# Patient Record
Sex: FEMALE | Race: BLACK OR AFRICAN AMERICAN | NOT HISPANIC OR LATINO | URBAN - METROPOLITAN AREA
[De-identification: names, ages, dates, MRNs, and addresses within clinical notes are randomized per-mention and may not be internally consistent; named-entity substitution may affect disease eponyms.]

---

## 2017-01-05 ENCOUNTER — OUTPATIENT (OUTPATIENT)
Dept: OUTPATIENT SERVICES | Facility: HOSPITAL | Age: 72
LOS: 1 days | Discharge: HOME | End: 2017-01-05

## 2017-01-05 ENCOUNTER — APPOINTMENT (OUTPATIENT)
Dept: INFUSION THERAPY | Facility: HOSPITAL | Age: 72
End: 2017-01-05

## 2017-01-05 DIAGNOSIS — C50.919 MALIGNANT NEOPLASM OF UNSPECIFIED SITE OF UNSPECIFIED FEMALE BREAST: ICD-10-CM

## 2017-01-05 DIAGNOSIS — Z98.89 OTHER SPECIFIED POSTPROCEDURAL STATES: Chronic | ICD-10-CM

## 2017-02-15 ENCOUNTER — OUTPATIENT (OUTPATIENT)
Dept: OUTPATIENT SERVICES | Facility: HOSPITAL | Age: 72
LOS: 1 days | Discharge: HOME | End: 2017-02-15

## 2017-02-15 DIAGNOSIS — Z98.89 OTHER SPECIFIED POSTPROCEDURAL STATES: Chronic | ICD-10-CM

## 2017-02-15 DIAGNOSIS — C50.919 MALIGNANT NEOPLASM OF UNSPECIFIED SITE OF UNSPECIFIED FEMALE BREAST: ICD-10-CM

## 2017-02-16 ENCOUNTER — APPOINTMENT (OUTPATIENT)
Dept: INFUSION THERAPY | Facility: HOSPITAL | Age: 72
End: 2017-02-16

## 2017-02-20 ENCOUNTER — APPOINTMENT (OUTPATIENT)
Dept: SURGICAL ONCOLOGY | Facility: CLINIC | Age: 72
End: 2017-02-20

## 2017-02-20 VITALS
HEIGHT: 61 IN | DIASTOLIC BLOOD PRESSURE: 90 MMHG | OXYGEN SATURATION: 96 % | TEMPERATURE: 98.2 F | RESPIRATION RATE: 15 BRPM | SYSTOLIC BLOOD PRESSURE: 140 MMHG | HEART RATE: 62 BPM

## 2017-03-30 ENCOUNTER — RESULT REVIEW (OUTPATIENT)
Age: 72
End: 2017-03-30

## 2017-03-30 ENCOUNTER — OUTPATIENT (OUTPATIENT)
Dept: OUTPATIENT SERVICES | Facility: HOSPITAL | Age: 72
LOS: 1 days | Discharge: HOME | End: 2017-03-30

## 2017-03-30 DIAGNOSIS — Z98.89 OTHER SPECIFIED POSTPROCEDURAL STATES: Chronic | ICD-10-CM

## 2017-03-30 DIAGNOSIS — C50.919 MALIGNANT NEOPLASM OF UNSPECIFIED SITE OF UNSPECIFIED FEMALE BREAST: ICD-10-CM

## 2017-03-31 ENCOUNTER — LABORATORY RESULT (OUTPATIENT)
Age: 72
End: 2017-03-31

## 2017-03-31 ENCOUNTER — APPOINTMENT (OUTPATIENT)
Dept: INFUSION THERAPY | Facility: HOSPITAL | Age: 72
End: 2017-03-31

## 2017-03-31 ENCOUNTER — APPOINTMENT (OUTPATIENT)
Dept: HEMATOLOGY ONCOLOGY | Facility: CLINIC | Age: 72
End: 2017-03-31

## 2017-03-31 VITALS
WEIGHT: 166.45 LBS | OXYGEN SATURATION: 94 % | BODY MASS INDEX: 31.45 KG/M2 | TEMPERATURE: 98.3 F | HEART RATE: 57 BPM | RESPIRATION RATE: 15 BRPM | SYSTOLIC BLOOD PRESSURE: 186 MMHG | DIASTOLIC BLOOD PRESSURE: 93 MMHG

## 2017-03-31 DIAGNOSIS — R76.8 OTHER SPECIFIED ABNORMAL IMMUNOLOGICAL FINDINGS IN SERUM: ICD-10-CM

## 2017-03-31 LAB
HCT VFR BLD CALC: 41.3 % — SIGNIFICANT CHANGE UP (ref 34.5–45)
HGB BLD-MCNC: 14.7 G/DL — SIGNIFICANT CHANGE UP (ref 11.5–15.5)
MCHC RBC-ENTMCNC: 33.3 PG — SIGNIFICANT CHANGE UP (ref 27–34)
MCHC RBC-ENTMCNC: 35.7 G/DL — SIGNIFICANT CHANGE UP (ref 32–36)
MCV RBC AUTO: 93.1 FL — SIGNIFICANT CHANGE UP (ref 80–100)
PLATELET # BLD AUTO: 250 K/UL — SIGNIFICANT CHANGE UP (ref 150–400)
RBC # BLD: 4.43 M/UL — SIGNIFICANT CHANGE UP (ref 3.8–5.2)
RBC # FLD: 11.5 % — SIGNIFICANT CHANGE UP (ref 10.3–14.5)
WBC # BLD: 6 K/UL — SIGNIFICANT CHANGE UP (ref 3.8–10.5)
WBC # FLD AUTO: 6 K/UL — SIGNIFICANT CHANGE UP (ref 3.8–10.5)

## 2017-04-05 ENCOUNTER — RESULT REVIEW (OUTPATIENT)
Age: 72
End: 2017-04-05

## 2017-05-01 ENCOUNTER — CHART COPY (OUTPATIENT)
Age: 72
End: 2017-05-01

## 2017-05-05 ENCOUNTER — OUTPATIENT (OUTPATIENT)
Dept: OUTPATIENT SERVICES | Facility: HOSPITAL | Age: 72
LOS: 1 days | End: 2017-05-05
Payer: COMMERCIAL

## 2017-05-05 VITALS
WEIGHT: 167.99 LBS | SYSTOLIC BLOOD PRESSURE: 147 MMHG | HEIGHT: 61 IN | TEMPERATURE: 97 F | OXYGEN SATURATION: 98 % | DIASTOLIC BLOOD PRESSURE: 89 MMHG | RESPIRATION RATE: 16 BRPM | HEART RATE: 55 BPM

## 2017-05-05 DIAGNOSIS — Z98.89 OTHER SPECIFIED POSTPROCEDURAL STATES: Chronic | ICD-10-CM

## 2017-05-05 DIAGNOSIS — Z01.818 ENCOUNTER FOR OTHER PREPROCEDURAL EXAMINATION: ICD-10-CM

## 2017-05-05 DIAGNOSIS — C50.912 MALIGNANT NEOPLASM OF UNSPECIFIED SITE OF LEFT FEMALE BREAST: ICD-10-CM

## 2017-05-05 DIAGNOSIS — I10 ESSENTIAL (PRIMARY) HYPERTENSION: ICD-10-CM

## 2017-05-05 DIAGNOSIS — C50.919 MALIGNANT NEOPLASM OF UNSPECIFIED SITE OF UNSPECIFIED FEMALE BREAST: ICD-10-CM

## 2017-05-05 DIAGNOSIS — Z00.00 ENCOUNTER FOR GENERAL ADULT MEDICAL EXAMINATION WITHOUT ABNORMAL FINDINGS: ICD-10-CM

## 2017-05-05 PROCEDURE — G0463: CPT

## 2017-05-05 RX ORDER — LIDOCAINE HCL 20 MG/ML
0.2 VIAL (ML) INJECTION ONCE
Qty: 0 | Refills: 0 | Status: DISCONTINUED | OUTPATIENT
Start: 2017-05-10 | End: 2017-05-25

## 2017-05-05 RX ORDER — CELECOXIB 200 MG/1
200 CAPSULE ORAL ONCE
Qty: 0 | Refills: 0 | Status: COMPLETED | OUTPATIENT
Start: 2017-05-10 | End: 2017-05-10

## 2017-05-05 RX ORDER — SODIUM CHLORIDE 9 MG/ML
3 INJECTION INTRAMUSCULAR; INTRAVENOUS; SUBCUTANEOUS EVERY 8 HOURS
Qty: 0 | Refills: 0 | Status: DISCONTINUED | OUTPATIENT
Start: 2017-05-10 | End: 2017-05-25

## 2017-05-05 RX ORDER — ACETAMINOPHEN 500 MG
975 TABLET ORAL ONCE
Qty: 0 | Refills: 0 | Status: COMPLETED | OUTPATIENT
Start: 2017-05-10 | End: 2017-05-10

## 2017-05-05 NOTE — H&P PST ADULT - NSANTHOSAYNRD_GEN_A_CORE
No. SWATHI screening performed.  STOP BANG Legend: 0-2 = LOW Risk; 3-4 = INTERMEDIATE Risk; 5-8 = HIGH Risk

## 2017-05-05 NOTE — H&P PST ADULT - PMH
Arthritis  right knee  Breast cancer  left s/p chemo and radiation  History of hepatitis B virus infection    Hypertension

## 2017-05-05 NOTE — H&P PST ADULT - HISTORY OF PRESENT ILLNESS
71 year old female PMH of HTN, Hepatitis B ( followed by GI), breast cancer s/p chemo and radiation 2015 for removal of mediport

## 2017-05-10 ENCOUNTER — OUTPATIENT (OUTPATIENT)
Dept: OUTPATIENT SERVICES | Facility: HOSPITAL | Age: 72
LOS: 1 days | End: 2017-05-10
Payer: COMMERCIAL

## 2017-05-10 ENCOUNTER — APPOINTMENT (OUTPATIENT)
Dept: SURGICAL ONCOLOGY | Facility: HOSPITAL | Age: 72
End: 2017-05-10

## 2017-05-10 ENCOUNTER — TRANSCRIPTION ENCOUNTER (OUTPATIENT)
Age: 72
End: 2017-05-10

## 2017-05-10 VITALS
HEIGHT: 61 IN | DIASTOLIC BLOOD PRESSURE: 97 MMHG | SYSTOLIC BLOOD PRESSURE: 180 MMHG | OXYGEN SATURATION: 98 % | TEMPERATURE: 98 F | WEIGHT: 167.99 LBS | RESPIRATION RATE: 16 BRPM | HEART RATE: 58 BPM

## 2017-05-10 VITALS
TEMPERATURE: 98 F | SYSTOLIC BLOOD PRESSURE: 145 MMHG | OXYGEN SATURATION: 100 % | DIASTOLIC BLOOD PRESSURE: 80 MMHG | HEART RATE: 57 BPM | RESPIRATION RATE: 18 BRPM

## 2017-05-10 DIAGNOSIS — C50.912 MALIGNANT NEOPLASM OF UNSPECIFIED SITE OF LEFT FEMALE BREAST: ICD-10-CM

## 2017-05-10 DIAGNOSIS — Z00.00 ENCOUNTER FOR GENERAL ADULT MEDICAL EXAMINATION WITHOUT ABNORMAL FINDINGS: ICD-10-CM

## 2017-05-10 DIAGNOSIS — Z98.89 OTHER SPECIFIED POSTPROCEDURAL STATES: Chronic | ICD-10-CM

## 2017-05-10 PROCEDURE — 36590 REMOVAL TUNNELED CV CATH: CPT

## 2017-05-10 RX ORDER — ONDANSETRON 8 MG/1
4 TABLET, FILM COATED ORAL ONCE
Qty: 0 | Refills: 0 | Status: DISCONTINUED | OUTPATIENT
Start: 2017-05-10 | End: 2017-05-25

## 2017-05-10 RX ORDER — SODIUM CHLORIDE 9 MG/ML
1000 INJECTION INTRAMUSCULAR; INTRAVENOUS; SUBCUTANEOUS
Qty: 0 | Refills: 0 | Status: DISCONTINUED | OUTPATIENT
Start: 2017-05-10 | End: 2017-05-25

## 2017-05-10 RX ORDER — OXYCODONE HYDROCHLORIDE 5 MG/1
5 TABLET ORAL ONCE
Qty: 0 | Refills: 0 | Status: DISCONTINUED | OUTPATIENT
Start: 2017-05-10 | End: 2017-05-10

## 2017-05-10 RX ADMIN — CELECOXIB 200 MILLIGRAM(S): 200 CAPSULE ORAL at 10:39

## 2017-05-10 RX ADMIN — Medication 975 MILLIGRAM(S): at 10:38

## 2017-05-10 NOTE — ASU DISCHARGE PLAN (ADULT/PEDIATRIC). - MEDICATION SUMMARY - MEDICATIONS TO TAKE
I will START or STAY ON the medications listed below when I get home from the hospital:    metoprolol succinate 25 mg oral tablet, extended release  -- 1 tab(s) by mouth once a day evening  -- Indication: For blood pressure    famotidine 20 mg oral tablet  --  by mouth as per ASU protocol   -- Indication: For GERD    Vitamin D2 50,000 intl units (1.25 mg) oral capsule  --  by mouth once a day  -- Indication: For Supplement

## 2017-05-10 NOTE — ASU DISCHARGE PLAN (ADULT/PEDIATRIC). - NOTIFY
Pain not relieved by Medications/Fever greater than 101 Bleeding that does not stop/Persistent Nausea and Vomiting/Inability to Tolerate Liquids or Foods/Pain not relieved by Medications/Swelling that continues/Unable to Urinate/Fever greater than 101

## 2017-05-11 ENCOUNTER — OUTPATIENT (OUTPATIENT)
Dept: OUTPATIENT SERVICES | Facility: HOSPITAL | Age: 72
LOS: 1 days | Discharge: HOME | End: 2017-05-11

## 2017-05-11 DIAGNOSIS — C50.919 MALIGNANT NEOPLASM OF UNSPECIFIED SITE OF UNSPECIFIED FEMALE BREAST: ICD-10-CM

## 2017-05-11 DIAGNOSIS — Z98.89 OTHER SPECIFIED POSTPROCEDURAL STATES: Chronic | ICD-10-CM

## 2017-05-12 ENCOUNTER — APPOINTMENT (OUTPATIENT)
Dept: INFUSION THERAPY | Facility: HOSPITAL | Age: 72
End: 2017-05-12

## 2017-06-26 ENCOUNTER — APPOINTMENT (OUTPATIENT)
Dept: SURGICAL ONCOLOGY | Facility: CLINIC | Age: 72
End: 2017-06-26

## 2017-06-26 VITALS
BODY MASS INDEX: 32.85 KG/M2 | SYSTOLIC BLOOD PRESSURE: 154 MMHG | DIASTOLIC BLOOD PRESSURE: 81 MMHG | HEIGHT: 61 IN | OXYGEN SATURATION: 94 % | WEIGHT: 174 LBS | HEART RATE: 77 BPM

## 2017-07-28 ENCOUNTER — OUTPATIENT (OUTPATIENT)
Dept: OUTPATIENT SERVICES | Facility: HOSPITAL | Age: 72
LOS: 1 days | Discharge: HOME | End: 2017-07-28

## 2017-07-28 DIAGNOSIS — Z98.89 OTHER SPECIFIED POSTPROCEDURAL STATES: Chronic | ICD-10-CM

## 2017-07-28 DIAGNOSIS — C50.919 MALIGNANT NEOPLASM OF UNSPECIFIED SITE OF UNSPECIFIED FEMALE BREAST: ICD-10-CM

## 2017-08-02 ENCOUNTER — RESULT REVIEW (OUTPATIENT)
Age: 72
End: 2017-08-02

## 2017-08-02 ENCOUNTER — APPOINTMENT (OUTPATIENT)
Dept: HEMATOLOGY ONCOLOGY | Facility: CLINIC | Age: 72
End: 2017-08-02
Payer: MEDICARE

## 2017-08-02 VITALS
BODY MASS INDEX: 32.41 KG/M2 | TEMPERATURE: 98.3 F | HEART RATE: 62 BPM | RESPIRATION RATE: 16 BRPM | SYSTOLIC BLOOD PRESSURE: 136 MMHG | WEIGHT: 171.52 LBS | OXYGEN SATURATION: 93 % | DIASTOLIC BLOOD PRESSURE: 85 MMHG

## 2017-08-02 LAB
HCT VFR BLD CALC: 42 % — SIGNIFICANT CHANGE UP (ref 34.5–45)
HGB BLD-MCNC: 15.4 G/DL — SIGNIFICANT CHANGE UP (ref 11.5–15.5)
MCHC RBC-ENTMCNC: 34.9 PG — HIGH (ref 27–34)
MCHC RBC-ENTMCNC: 36.7 G/DL — HIGH (ref 32–36)
MCV RBC AUTO: 95.1 FL — SIGNIFICANT CHANGE UP (ref 80–100)
PLATELET # BLD AUTO: 266 K/UL — SIGNIFICANT CHANGE UP (ref 150–400)
RBC # BLD: 4.42 M/UL — SIGNIFICANT CHANGE UP (ref 3.8–5.2)
RBC # FLD: 11.8 % — SIGNIFICANT CHANGE UP (ref 10.3–14.5)
WBC # BLD: 7.3 K/UL — SIGNIFICANT CHANGE UP (ref 3.8–10.5)
WBC # FLD AUTO: 7.3 K/UL — SIGNIFICANT CHANGE UP (ref 3.8–10.5)

## 2017-08-02 PROCEDURE — 99214 OFFICE O/P EST MOD 30 MIN: CPT

## 2017-08-08 LAB
ALBUMIN SERPL ELPH-MCNC: 4.6 G/DL
ALP BLD-CCNC: 116 U/L
ALT SERPL-CCNC: 43 U/L
ANION GAP SERPL CALC-SCNC: 15 MMOL/L
AST SERPL-CCNC: 31 U/L
BILIRUB SERPL-MCNC: 0.5 MG/DL
BUN SERPL-MCNC: 15 MG/DL
CALCIUM SERPL-MCNC: 10.1 MG/DL
CEA SERPL-MCNC: 1.3 NG/ML
CHLORIDE SERPL-SCNC: 106 MMOL/L
CO2 SERPL-SCNC: 24 MMOL/L
CREAT SERPL-MCNC: 0.59 MG/DL
GLUCOSE SERPL-MCNC: 74 MG/DL
HBV DNA # SERPL NAA+PROBE: NOT DETECTED IU/ML
HEPB DNA PCR LOG: NOT DETECTED LOGIU/ML
POTASSIUM SERPL-SCNC: 4.4 MMOL/L
PROT SERPL-MCNC: 7.8 G/DL
SODIUM SERPL-SCNC: 145 MMOL/L

## 2017-10-26 ENCOUNTER — APPOINTMENT (OUTPATIENT)
Dept: MAMMOGRAPHY | Facility: IMAGING CENTER | Age: 72
End: 2017-10-26
Payer: MEDICARE

## 2017-10-26 ENCOUNTER — OUTPATIENT (OUTPATIENT)
Dept: OUTPATIENT SERVICES | Facility: HOSPITAL | Age: 72
LOS: 1 days | End: 2017-10-26
Payer: COMMERCIAL

## 2017-10-26 ENCOUNTER — APPOINTMENT (OUTPATIENT)
Dept: ULTRASOUND IMAGING | Facility: IMAGING CENTER | Age: 72
End: 2017-10-26
Payer: MEDICARE

## 2017-10-26 DIAGNOSIS — C50.919 MALIGNANT NEOPLASM OF UNSPECIFIED SITE OF UNSPECIFIED FEMALE BREAST: ICD-10-CM

## 2017-10-26 DIAGNOSIS — Z98.89 OTHER SPECIFIED POSTPROCEDURAL STATES: Chronic | ICD-10-CM

## 2017-10-26 PROCEDURE — G0279: CPT

## 2017-10-26 PROCEDURE — G0204: CPT | Mod: 26

## 2017-10-26 PROCEDURE — G0279: CPT | Mod: 26

## 2017-10-26 PROCEDURE — 77066 DX MAMMO INCL CAD BI: CPT

## 2017-11-02 ENCOUNTER — APPOINTMENT (OUTPATIENT)
Dept: SURGICAL ONCOLOGY | Facility: CLINIC | Age: 72
End: 2017-11-02
Payer: MEDICARE

## 2017-11-02 VITALS
DIASTOLIC BLOOD PRESSURE: 95 MMHG | OXYGEN SATURATION: 93 % | HEART RATE: 84 BPM | BODY MASS INDEX: 32.85 KG/M2 | WEIGHT: 174 LBS | HEIGHT: 61 IN | SYSTOLIC BLOOD PRESSURE: 152 MMHG

## 2017-11-02 PROCEDURE — 99214 OFFICE O/P EST MOD 30 MIN: CPT

## 2017-11-29 ENCOUNTER — RESULT REVIEW (OUTPATIENT)
Age: 72
End: 2017-11-29

## 2017-12-06 ENCOUNTER — OUTPATIENT (OUTPATIENT)
Dept: OUTPATIENT SERVICES | Facility: HOSPITAL | Age: 72
LOS: 1 days | Discharge: HOME | End: 2017-12-06

## 2017-12-06 DIAGNOSIS — Z98.89 OTHER SPECIFIED POSTPROCEDURAL STATES: Chronic | ICD-10-CM

## 2017-12-06 DIAGNOSIS — C50.919 MALIGNANT NEOPLASM OF UNSPECIFIED SITE OF UNSPECIFIED FEMALE BREAST: ICD-10-CM

## 2017-12-12 ENCOUNTER — APPOINTMENT (OUTPATIENT)
Dept: HEMATOLOGY ONCOLOGY | Facility: CLINIC | Age: 72
End: 2017-12-12
Payer: MEDICARE

## 2017-12-12 ENCOUNTER — RESULT REVIEW (OUTPATIENT)
Age: 72
End: 2017-12-12

## 2017-12-12 VITALS
BODY MASS INDEX: 32.91 KG/M2 | OXYGEN SATURATION: 94 % | TEMPERATURE: 98.1 F | SYSTOLIC BLOOD PRESSURE: 170 MMHG | WEIGHT: 174.17 LBS | RESPIRATION RATE: 16 BRPM | DIASTOLIC BLOOD PRESSURE: 109 MMHG | HEART RATE: 79 BPM

## 2017-12-12 LAB
HCT VFR BLD CALC: 44.1 % — SIGNIFICANT CHANGE UP (ref 34.5–45)
HGB BLD-MCNC: 15.8 G/DL — HIGH (ref 11.5–15.5)
MCHC RBC-ENTMCNC: 34.3 PG — HIGH (ref 27–34)
MCHC RBC-ENTMCNC: 35.9 G/DL — SIGNIFICANT CHANGE UP (ref 32–36)
MCV RBC AUTO: 95.5 FL — SIGNIFICANT CHANGE UP (ref 80–100)
PLATELET # BLD AUTO: 265 K/UL — SIGNIFICANT CHANGE UP (ref 150–400)
RBC # BLD: 4.61 M/UL — SIGNIFICANT CHANGE UP (ref 3.8–5.2)
RBC # FLD: 11.8 % — SIGNIFICANT CHANGE UP (ref 10.3–14.5)
WBC # BLD: 6.8 K/UL — SIGNIFICANT CHANGE UP (ref 3.8–10.5)
WBC # FLD AUTO: 6.8 K/UL — SIGNIFICANT CHANGE UP (ref 3.8–10.5)

## 2017-12-12 PROCEDURE — 99215 OFFICE O/P EST HI 40 MIN: CPT

## 2017-12-27 ENCOUNTER — APPOINTMENT (OUTPATIENT)
Dept: HEMATOLOGY ONCOLOGY | Facility: CLINIC | Age: 72
End: 2017-12-27
Payer: SELF-PAY

## 2017-12-27 PROCEDURE — 96040M: CUSTOM

## 2018-02-08 ENCOUNTER — OUTPATIENT (OUTPATIENT)
Dept: OUTPATIENT SERVICES | Facility: HOSPITAL | Age: 73
LOS: 1 days | Discharge: HOME | End: 2018-02-08

## 2018-02-08 DIAGNOSIS — C50.919 MALIGNANT NEOPLASM OF UNSPECIFIED SITE OF UNSPECIFIED FEMALE BREAST: ICD-10-CM

## 2018-02-08 DIAGNOSIS — Z98.89 OTHER SPECIFIED POSTPROCEDURAL STATES: Chronic | ICD-10-CM

## 2018-02-13 ENCOUNTER — APPOINTMENT (OUTPATIENT)
Dept: HEMATOLOGY ONCOLOGY | Facility: CLINIC | Age: 73
End: 2018-02-13
Payer: SELF-PAY

## 2018-02-13 PROCEDURE — 99499A: CUSTOM | Mod: NC

## 2018-02-15 ENCOUNTER — APPOINTMENT (OUTPATIENT)
Dept: ULTRASOUND IMAGING | Facility: IMAGING CENTER | Age: 73
End: 2018-02-15

## 2018-02-16 ENCOUNTER — INPATIENT (INPATIENT)
Facility: HOSPITAL | Age: 73
LOS: 6 days | Discharge: NURSING, REHAB OR LONG-TERM CARE | End: 2018-02-23
Attending: HOSPITALIST | Admitting: HOSPITALIST
Payer: MEDICARE

## 2018-02-16 VITALS
OXYGEN SATURATION: 100 % | TEMPERATURE: 98 F | DIASTOLIC BLOOD PRESSURE: 87 MMHG | RESPIRATION RATE: 16 BRPM | SYSTOLIC BLOOD PRESSURE: 133 MMHG | HEART RATE: 85 BPM

## 2018-02-16 DIAGNOSIS — I63.9 CEREBRAL INFARCTION, UNSPECIFIED: ICD-10-CM

## 2018-02-16 DIAGNOSIS — I10 ESSENTIAL (PRIMARY) HYPERTENSION: ICD-10-CM

## 2018-02-16 DIAGNOSIS — N39.0 URINARY TRACT INFECTION, SITE NOT SPECIFIED: ICD-10-CM

## 2018-02-16 DIAGNOSIS — Z98.89 OTHER SPECIFIED POSTPROCEDURAL STATES: Chronic | ICD-10-CM

## 2018-02-16 DIAGNOSIS — M19.90 UNSPECIFIED OSTEOARTHRITIS, UNSPECIFIED SITE: ICD-10-CM

## 2018-02-16 LAB
ALBUMIN SERPL ELPH-MCNC: 4.3 G/DL — SIGNIFICANT CHANGE UP (ref 3.3–5)
ALP SERPL-CCNC: 97 U/L — SIGNIFICANT CHANGE UP (ref 40–120)
ALT FLD-CCNC: 31 U/L — SIGNIFICANT CHANGE UP (ref 4–33)
APPEARANCE UR: CLEAR — SIGNIFICANT CHANGE UP
APTT BLD: 31.4 SEC — SIGNIFICANT CHANGE UP (ref 27.5–37.4)
AST SERPL-CCNC: 24 U/L — SIGNIFICANT CHANGE UP (ref 4–32)
BACTERIA # UR AUTO: SIGNIFICANT CHANGE UP
BASE EXCESS BLDV CALC-SCNC: 3.4 MMOL/L — SIGNIFICANT CHANGE UP
BASOPHILS # BLD AUTO: 0.04 K/UL — SIGNIFICANT CHANGE UP (ref 0–0.2)
BASOPHILS NFR BLD AUTO: 0.6 % — SIGNIFICANT CHANGE UP (ref 0–2)
BILIRUB SERPL-MCNC: 0.6 MG/DL — SIGNIFICANT CHANGE UP (ref 0.2–1.2)
BILIRUB UR-MCNC: NEGATIVE — SIGNIFICANT CHANGE UP
BLOOD GAS VENOUS - CREATININE: 0.39 MG/DL — LOW (ref 0.5–1.3)
BLOOD UR QL VISUAL: HIGH
BUN SERPL-MCNC: 8 MG/DL — SIGNIFICANT CHANGE UP (ref 7–23)
CALCIUM SERPL-MCNC: 9.2 MG/DL — SIGNIFICANT CHANGE UP (ref 8.4–10.5)
CHLORIDE BLDV-SCNC: 107 MMOL/L — SIGNIFICANT CHANGE UP (ref 96–108)
CHLORIDE SERPL-SCNC: 100 MMOL/L — SIGNIFICANT CHANGE UP (ref 98–107)
CO2 SERPL-SCNC: 25 MMOL/L — SIGNIFICANT CHANGE UP (ref 22–31)
COLOR SPEC: YELLOW — SIGNIFICANT CHANGE UP
CREAT SERPL-MCNC: 0.53 MG/DL — SIGNIFICANT CHANGE UP (ref 0.5–1.3)
EOSINOPHIL # BLD AUTO: 0.08 K/UL — SIGNIFICANT CHANGE UP (ref 0–0.5)
EOSINOPHIL NFR BLD AUTO: 1.2 % — SIGNIFICANT CHANGE UP (ref 0–6)
GAS PNL BLDV: 136 MMOL/L — SIGNIFICANT CHANGE UP (ref 136–146)
GLUCOSE BLDV-MCNC: 123 — HIGH (ref 70–99)
GLUCOSE SERPL-MCNC: 120 MG/DL — HIGH (ref 70–99)
GLUCOSE UR-MCNC: NEGATIVE — SIGNIFICANT CHANGE UP
HCO3 BLDV-SCNC: 26 MMOL/L — SIGNIFICANT CHANGE UP (ref 20–27)
HCT VFR BLD CALC: 45.5 % — HIGH (ref 34.5–45)
HCT VFR BLDV CALC: 48.5 % — HIGH (ref 34.5–45)
HGB BLD-MCNC: 15.7 G/DL — HIGH (ref 11.5–15.5)
HGB BLDV-MCNC: 15.9 G/DL — HIGH (ref 11.5–15.5)
IMM GRANULOCYTES # BLD AUTO: 0.03 # — SIGNIFICANT CHANGE UP
IMM GRANULOCYTES NFR BLD AUTO: 0.5 % — SIGNIFICANT CHANGE UP (ref 0–1.5)
INR BLD: 1.06 — SIGNIFICANT CHANGE UP (ref 0.88–1.17)
KETONES UR-MCNC: NEGATIVE — SIGNIFICANT CHANGE UP
LACTATE BLDV-MCNC: 2.4 MMOL/L — HIGH (ref 0.5–2)
LEUKOCYTE ESTERASE UR-ACNC: HIGH
LYMPHOCYTES # BLD AUTO: 1.83 K/UL — SIGNIFICANT CHANGE UP (ref 1–3.3)
LYMPHOCYTES # BLD AUTO: 28.2 % — SIGNIFICANT CHANGE UP (ref 13–44)
MCHC RBC-ENTMCNC: 32 PG — SIGNIFICANT CHANGE UP (ref 27–34)
MCHC RBC-ENTMCNC: 34.5 % — SIGNIFICANT CHANGE UP (ref 32–36)
MCV RBC AUTO: 92.9 FL — SIGNIFICANT CHANGE UP (ref 80–100)
MONOCYTES # BLD AUTO: 0.52 K/UL — SIGNIFICANT CHANGE UP (ref 0–0.9)
MONOCYTES NFR BLD AUTO: 8 % — SIGNIFICANT CHANGE UP (ref 2–14)
MUCOUS THREADS # UR AUTO: SIGNIFICANT CHANGE UP
NEUTROPHILS # BLD AUTO: 4 K/UL — SIGNIFICANT CHANGE UP (ref 1.8–7.4)
NEUTROPHILS NFR BLD AUTO: 61.5 % — SIGNIFICANT CHANGE UP (ref 43–77)
NITRITE UR-MCNC: NEGATIVE — SIGNIFICANT CHANGE UP
NRBC # FLD: 0 — SIGNIFICANT CHANGE UP
PCO2 BLDV: 48 MMHG — SIGNIFICANT CHANGE UP (ref 41–51)
PH BLDV: 7.38 PH — SIGNIFICANT CHANGE UP (ref 7.32–7.43)
PH UR: 6.5 — SIGNIFICANT CHANGE UP (ref 4.6–8)
PLATELET # BLD AUTO: 257 K/UL — SIGNIFICANT CHANGE UP (ref 150–400)
PMV BLD: 8.8 FL — SIGNIFICANT CHANGE UP (ref 7–13)
PO2 BLDV: 34 MMHG — LOW (ref 35–40)
POTASSIUM BLDV-SCNC: 3.6 MMOL/L — SIGNIFICANT CHANGE UP (ref 3.4–4.5)
POTASSIUM SERPL-MCNC: 4 MMOL/L — SIGNIFICANT CHANGE UP (ref 3.5–5.3)
POTASSIUM SERPL-SCNC: 4 MMOL/L — SIGNIFICANT CHANGE UP (ref 3.5–5.3)
PROT SERPL-MCNC: 7.9 G/DL — SIGNIFICANT CHANGE UP (ref 6–8.3)
PROT UR-MCNC: 20 MG/DL — SIGNIFICANT CHANGE UP
PROTHROM AB SERPL-ACNC: 12.2 SEC — SIGNIFICANT CHANGE UP (ref 9.8–13.1)
RBC # BLD: 4.9 M/UL — SIGNIFICANT CHANGE UP (ref 3.8–5.2)
RBC # FLD: 12.3 % — SIGNIFICANT CHANGE UP (ref 10.3–14.5)
RBC CASTS # UR COMP ASSIST: HIGH (ref 0–?)
SAO2 % BLDV: 61.3 % — SIGNIFICANT CHANGE UP (ref 60–85)
SODIUM SERPL-SCNC: 140 MMOL/L — SIGNIFICANT CHANGE UP (ref 135–145)
SP GR SPEC: 1.02 — SIGNIFICANT CHANGE UP (ref 1–1.04)
SQUAMOUS # UR AUTO: SIGNIFICANT CHANGE UP
UROBILINOGEN FLD QL: NORMAL MG/DL — SIGNIFICANT CHANGE UP
WBC # BLD: 6.5 K/UL — SIGNIFICANT CHANGE UP (ref 3.8–10.5)
WBC # FLD AUTO: 6.5 K/UL — SIGNIFICANT CHANGE UP (ref 3.8–10.5)
WBC UR QL: HIGH (ref 0–?)

## 2018-02-16 PROCEDURE — 99223 1ST HOSP IP/OBS HIGH 75: CPT

## 2018-02-16 PROCEDURE — 70547 MR ANGIOGRAPHY NECK W/O DYE: CPT | Mod: 26

## 2018-02-16 PROCEDURE — 70551 MRI BRAIN STEM W/O DYE: CPT | Mod: 26

## 2018-02-16 PROCEDURE — 70450 CT HEAD/BRAIN W/O DYE: CPT | Mod: 26

## 2018-02-16 RX ORDER — ASPIRIN/CALCIUM CARB/MAGNESIUM 324 MG
81 TABLET ORAL DAILY
Qty: 0 | Refills: 0 | Status: DISCONTINUED | OUTPATIENT
Start: 2018-02-17 | End: 2018-02-23

## 2018-02-16 RX ORDER — ONDANSETRON 8 MG/1
4 TABLET, FILM COATED ORAL EVERY 8 HOURS
Qty: 0 | Refills: 0 | Status: DISCONTINUED | OUTPATIENT
Start: 2018-02-16 | End: 2018-02-23

## 2018-02-16 RX ORDER — ATORVASTATIN CALCIUM 80 MG/1
80 TABLET, FILM COATED ORAL AT BEDTIME
Qty: 0 | Refills: 0 | Status: DISCONTINUED | OUTPATIENT
Start: 2018-02-16 | End: 2018-02-23

## 2018-02-16 RX ORDER — ENOXAPARIN SODIUM 100 MG/ML
40 INJECTION SUBCUTANEOUS EVERY 24 HOURS
Qty: 0 | Refills: 0 | Status: DISCONTINUED | OUTPATIENT
Start: 2018-02-16 | End: 2018-02-23

## 2018-02-16 RX ORDER — SODIUM CHLORIDE 9 MG/ML
1000 INJECTION INTRAMUSCULAR; INTRAVENOUS; SUBCUTANEOUS ONCE
Qty: 0 | Refills: 0 | Status: COMPLETED | OUTPATIENT
Start: 2018-02-16 | End: 2018-02-16

## 2018-02-16 RX ORDER — MAGNESIUM SULFATE 500 MG/ML
1 VIAL (ML) INJECTION ONCE
Qty: 0 | Refills: 0 | Status: COMPLETED | OUTPATIENT
Start: 2018-02-16 | End: 2018-02-16

## 2018-02-16 RX ORDER — ONDANSETRON 8 MG/1
4 TABLET, FILM COATED ORAL ONCE
Qty: 0 | Refills: 0 | Status: COMPLETED | OUTPATIENT
Start: 2018-02-16 | End: 2018-02-16

## 2018-02-16 RX ORDER — ACETAMINOPHEN 500 MG
650 TABLET ORAL EVERY 6 HOURS
Qty: 0 | Refills: 0 | Status: DISCONTINUED | OUTPATIENT
Start: 2018-02-16 | End: 2018-02-23

## 2018-02-16 RX ORDER — ASPIRIN/CALCIUM CARB/MAGNESIUM 324 MG
81 TABLET ORAL ONCE
Qty: 0 | Refills: 0 | Status: COMPLETED | OUTPATIENT
Start: 2018-02-16 | End: 2018-02-16

## 2018-02-16 RX ADMIN — ENOXAPARIN SODIUM 40 MILLIGRAM(S): 100 INJECTION SUBCUTANEOUS at 21:25

## 2018-02-16 RX ADMIN — ONDANSETRON 4 MILLIGRAM(S): 8 TABLET, FILM COATED ORAL at 10:15

## 2018-02-16 RX ADMIN — Medication 100 GRAM(S): at 18:13

## 2018-02-16 RX ADMIN — SODIUM CHLORIDE 1000 MILLILITER(S): 9 INJECTION INTRAMUSCULAR; INTRAVENOUS; SUBCUTANEOUS at 10:15

## 2018-02-16 RX ADMIN — ATORVASTATIN CALCIUM 80 MILLIGRAM(S): 80 TABLET, FILM COATED ORAL at 22:46

## 2018-02-16 RX ADMIN — Medication 81 MILLIGRAM(S): at 13:42

## 2018-02-16 RX ADMIN — ONDANSETRON 4 MILLIGRAM(S): 8 TABLET, FILM COATED ORAL at 21:48

## 2018-02-16 NOTE — CONSULT NOTE ADULT - ATTENDING COMMENTS
Pt was presented on AM rounds and examined at bedside. States that she was evaluated by another neurologist 2 weeks age similar symptoms and MRI at that time showed an acute stroke, unknown location. Today's exam is non focal.  MRI head shows both left and right hemispheric acute strokes suggesting an embolic event.  No lesion identified to explain her vertigo. Will need TTE with bubble study and JORGE.

## 2018-02-16 NOTE — H&P ADULT - FAMILY HISTORY
Father  Still living? Unknown  Family history of stroke, Age at diagnosis: Age Unknown     Sibling  Still living? Unknown  Family history of hypertension, Age at diagnosis: Age Unknown

## 2018-02-16 NOTE — ED PROVIDER NOTE - PROGRESS NOTE DETAILS
Pt stable. VSS. Labs, imaging, neuro c/s appreciated. Pt's PMD is [facility], will admit to hospitalist. Dr. Mullen and Tele PA aware.  Won Reynolds MD PGY-4

## 2018-02-16 NOTE — H&P ADULT - ASSESSMENT
71 yo F with a PMHx of CVA, HTN, breast cancer (s/p surgery), HTN, History of Hepatitis B virus infection, Arthritis R knee, presents to the ED c/o headache and dizziness that began this morning, with left lower lobe infarct (indeterminate age), admitted to telemetry to r/o stroke.

## 2018-02-16 NOTE — H&P ADULT - NEGATIVE CARDIOVASCULAR SYMPTOMS
no peripheral edema/no dyspnea on exertion/no orthopnea/no claudication/no paroxysmal nocturnal dyspnea/no palpitations

## 2018-02-16 NOTE — H&P ADULT - NSHPSOCIALHISTORY_GEN_ALL_CORE
73 yo F lives with her  and son. She uses a cane to walk around the house. Pt denies alcohol, illicit drug use, and/or smoking.

## 2018-02-16 NOTE — ED PROVIDER NOTE - PMH
Arthritis  right knee  Breast cancer  left s/p chemo and radiation  CVA (cerebral vascular accident)    History of hepatitis B virus infection    Hypertension

## 2018-02-16 NOTE — ED ADULT NURSE NOTE - OBJECTIVE STATEMENT
pt brought in by family for dizziness and vomiting--pt seen in ER 2 weeks ago and had a slight stroke  pt had #20 placed rt antecubital--labs drawn and sent

## 2018-02-16 NOTE — CONSULT NOTE ADULT - SUBJECTIVE AND OBJECTIVE BOX
NEUROLOGY CONSULT NOTE    Patient is a 72y old  Female who presents with a chief complaint of     HPI:  Ms. Luis is a 72 RHD y/o woman pmh recently diagnosed HTN and recently diagnosed CVA unknown location she p/w 'room spinning' sensation that began upon awakening this am. Dizziness a/w HA and nausea.  CRUZ described a throbbing posterior HA. She was recently diagnosed with a stroke on MRI but unable to state cva's location. Was scheduled for cerebral vessel imaging today but missed it to visit ER for symptoms.      In the ER she received po valium but vomited it. Her CT head w/o johanny showed Left frontal lobe infarct of indeterminate age.    Neurological Review of Systems:  No difficulty with language.  No vision loss or double vision.  No dizziness, vertigo or new hearing loss.  No difficulty with speech or swallowing.  No focal weakness.  No focal sensory changes.  No numbness or tingling in the bilateral lower extremities.  No difficulty with balance.  No difficulty with ambulation.        MEDICATIONS  (STANDING):  aspirin  chewable 81 milliGRAM(s) Oral once  diazepam    Tablet 5 milliGRAM(s) Oral once    MEDICATIONS  (PRN):    Allergies    No Known Allergies    Intolerances      PAST MEDICAL & SURGICAL HISTORY:  CVA (cerebral vascular accident)  Arthritis: right knee  Breast cancer: left s/p chemo and radiation  History of hepatitis B virus infection  Hypertension  S/P lumpectomy, left breast: 2015 with axilaary node dissection    FAMILY HISTORY:    SOCIAL HISTORY: non smoker/ former smoker/ active smoker    Review of Systems:  Constitutional: No generalized weakness. No fevers or chills.                    Eyes, Ears, Mouth, Throat: No vision loss   Respiratory: No shortness of breath or cough.                                Cardiovascular: No chest pain or palpitations  Gastrointestinal: No nausea or vomiting.                                         Genitourinary: No urinary incontinence or burning on urination.  Musculoskeletal: No joint pain.                                                           Dermatologic: No rash.  Neurological: as per HPI                                                                      Psychiatric: No behavioral problems.  Endocrine: No known hypoglycemia.               Hematologic/Lymphatic: No easy bleeding.    O:  Vital Signs Last 24 Hrs  T(C): 36.6 (2018 09:06), Max: 36.6 (2018 09:06)  T(F): 97.9 (2018 09:06), Max: 97.9 (2018 09:06)  HR: 86 (2018 13:16) (85 - 86)  BP: 120/78 (2018 13:16) (120/78 - 133/87)  BP(mean): --  RR: 16 (2018 13:16) (16 - 16)  SpO2: 100% (2018 13:16) (100% - 100%)    General Exam:   General appearance: No acute distress                 Cardiovascular: Pedal dorsalis pulses intact bilaterally    MENTAL STATUS: Orientated to self, date and place.  Attention intact.  No dysarthria, aphasia or neglect.  Knowledge intact.  Registration intact.       CRANIAL NERVES: CN I - not tested.  PERRL, EOMI, VFF, no nystagmus or diplopia.  No APD.  Fundi not visualized.  CN V1-3 intact to light touch and pinprick.  No facial asymmetry.  Hearing intact to finger rub bilaterally.  Tongue, uvula and palate midline.  Sternocleidomastoid and Trapezius intact bilaterally.    MOTOR:   Tone: normal w/ 5/5 x 4, no drift.                   No dysmetria on finger-nose-finger or heel-shin-heel    SENSORY: intact to light touch     GAIT: unable to walk independently.  Yesterday was able to walk without any deficits.       LABS:                        15.7   6.50  )-----------( 257      ( 2018 09:55 )             45.5     02-16    140  |  100  |  8   ----------------------------<  120<H>  4.0   |  25  |  0.53    Ca    9.2      2018 09:55    TPro  7.9  /  Alb  4.3  /  TBili  0.6  /  DBili  x   /  AST  24  /  ALT  31  /  AlkPhos  97  02-16    PT/INR - ( 2018 09:55 )   PT: 12.2 SEC;   INR: 1.06          PTT - ( 2018 09:55 )  PTT:31.4 SEC  Urinalysis Basic - ( 2018 11:00 )    Color: YELLOW / Appearance: CLEAR / S.017 / pH: 6.5  Gluc: NEGATIVE / Ketone: NEGATIVE  / Bili: NEGATIVE / Urobili: NORMAL mg/dL   Blood: TRACE / Protein: 20 mg/dL / Nitrite: NEGATIVE   Leuk Esterase: SMALL / RBC: 5-10 / WBC 5-10   Sq Epi: OCC / Non Sq Epi: x / Bacteria: FEW      LIVER FUNCTIONS - ( 2018 09:55 )  Alb: 4.3 g/dL / Pro: 7.9 g/dL / ALK PHOS: 97 u/L / ALT: 31 u/L / AST: 24 u/L / GGT: x                   RADIOLOGY & ADDITIONAL STUDIES:    [x] CTH: Left frontal lobe infarct of indeterminate age. If clinically warranted recommend brain MRI if not contraindicated or short-term follow-up head CT. No acute intracranial hemorrhage. No midline shift.

## 2018-02-16 NOTE — ED PROVIDER NOTE - ATTENDING CONTRIBUTION TO CARE
I was physically present for the E/M service provided. I agree with above history, physical, and plan which I have reviewed and edited where appropriate. I was physically present for the key portions of the service provided.    72 yr old female with hx of CVA, HTN, left breast cancer (s/p lumpectomy and Lymph node dissection) presents to ed c/o dizziness since am.  pt had MRI outpt 1 wk ago showing + " small cva".  pt 2 wks ago had unsteady gait, n/v, dizziness which resolved.  no fever, no chills, no visual changes, no headache, no numbness or tingling, no sob, no cough, no cp, no palpitations, no leg swelling, no syncope, no abd pain, no n/v/d, no dysuria, no rashes    *GEN:   comfortable, in no apparent distress, AOx3  *EYES:   PERRL, extra-occular movements intact  *HEENT:   airway patent, moist mucosal membranes, uvula midline  *CV:   regular rate and rhythm, normal S1/S2, no murmur  *RESP:   clear to auscultation bilaterally, non-labored, speaking in full sentences  *ABD:   soft, non tender, no guarding  *MSK:   no musculoskeletal tenderness, 5/5 strength, moving all extremity  *SKIN:   dry, intact, no rash  *NEURO:   AOx3, no focal weakness or loss of sensation, GCS 15, normal finger to nose, normal rapid hand movement    pt with confirmed outpt cva will obtain ct head, labs, ekg, neuro consult give asa since pt didn't take it- admit for further cva workup.

## 2018-02-16 NOTE — ED ADULT TRIAGE NOTE - CHIEF COMPLAINT QUOTE
Pt brought in by EMS for episode of dizziness and nausea this morning that has currently mostly resolved. Pt had minor stroke 3 weeks ago w slight right sided residual weakness.  Denies any slurred speech or new unilateral weakness. No new neuro deficits noted.

## 2018-02-16 NOTE — H&P ADULT - ATTENDING COMMENTS
71 yo F with h/o breast cancer s/p lumectomy/xrt/chemo x12 tx (completed 2 years ago) and HTN w/ recent dx of CVA presenting w/ vertigo and HA, now improved aside from mild HA.  AVSS, labs wnl, seen by neuro who recommend further w/u.    #Vertigo  -MRA/MRI  -TTE  -tele  -meclizine/valium/zofran prn    #HTN: permissive HTN until tomorrow however now normotensive  -monitor BP    # Breast Ca in remission  -OP Tiffanie f/u

## 2018-02-16 NOTE — ED ADULT NURSE REASSESSMENT NOTE - NS ED NURSE REASSESS COMMENT FT1
Pt. alert, awake, sitting up in stretcher.  Reports partial improvement of symptoms/dizziness since arrival. Respirations even, unlabored. No obvious neuro deficits or acute weakness. Sent to MRI in [facility] accompanied by family.

## 2018-02-16 NOTE — H&P ADULT - NSHPLABSRESULTS_GEN_ALL_CORE
LABS:                        15.7   6.50  )-----------( 257      ( 2018 09:55 )             45.5         140  |  100  |  8   ----------------------------<  120<H>  4.0   |  25  |  0.53    Ca    9.2      2018 09:55    TPro  7.9  /  Alb  4.3  /  TBili  0.6  /  DBili  x   /  AST  24  /  ALT  31  /  AlkPhos  97        PT/INR - ( 2018 09:55 )   PT: 12.2 SEC;   INR: 1.06          PTT - ( 2018 09:55 )  PTT:31.4 SEC      Urinalysis Basic - ( 2018 11:00 )    Color: YELLOW / Appearance: CLEAR / S.017 / pH: 6.5  Gluc: NEGATIVE / Ketone: NEGATIVE  / Bili: NEGATIVE / Urobili: NORMAL mg/dL   Blood: TRACE / Protein: 20 mg/dL / Nitrite: NEGATIVE   Leuk Esterase: SMALL / RBC: 5-10 / WBC 5-10   Sq Epi: OCC / Non Sq Epi: x / Bacteria: FEW    VBG  @ 09:55  pH: 7.38/pCO2: 48 /pO2: 34/HCO3: 26/lactate: 2.4    RADIOLOGY & ADDITIONAL TESTS:    CT reviewed   Left frontal lobe infarct   Mild-moderate chronic microvascular cerebral white matter changes.

## 2018-02-16 NOTE — H&P ADULT - NEUROLOGICAL DETAILS
alert and oriented x 3/responds to verbal commands/sensation intact responds to verbal commands/alert and oriented x 3/responds to pain/cranial nerves intact/normal strength/sensation intact

## 2018-02-16 NOTE — CONSULT NOTE ADULT - ASSESSMENT
71 y/o RHD woman pmh htn, cva p/w 'room spinning' sensation upon awakening this am that is monophasic. No new motor/sensory/language/vision deficits. NIHSS 0. CT H shows left fontal lobe infarct. NIHSS 0 but symptomatic dizziness persists    [] Would admit   [] Cohort to 4th floor  [] MRI brain w/o johanny  [] MRA head w/o johanny  [] MRA neck w/o johanny  [] TTE with bubble study  [] Permissive HTN x 24 hr with gradual normotensive goals beginning 2/17/18  [] asa 81mg  [] A1c and LDL level  [] statin (goal LDL <100)  [] PT/OT 71 y/o RHD woman pmh htn, cva p/w 'room spinning' sensation upon awakening this am that is monophasic. No new motor/sensory/language/vision deficits. NIHSS 0. CT H shows left fontal lobe infarct. NIHSS 0 but symptomatic dizziness persists    [] Would admit   [] Cohort to 4th floor  [] MRI brain w/o johanny  [] MRA head w/o johanny  [] MRA neck w/o johanny  [] TTE with bubble study  [] Permissive HTN x 24 hr with gradual normotensive goals beginning 2/17/18  [] asa 81mg  [] A1c and LDL level  [] statin (goal LDL <100)  [] PT/OT  [] Urine culture

## 2018-02-16 NOTE — ED PROVIDER NOTE - MEDICAL DECISION MAKING DETAILS
vertigo concern for central given hx, will obtain ct, labs, ecg, consult neuro, admit for further w/u and management

## 2018-02-16 NOTE — H&P ADULT - NSHPPHYSICALEXAM_GEN_ALL_CORE
T(C): 36.6 (02-16-18 @ 09:06), Max: 36.6 (02-16-18 @ 09:06)  HR: 86 (02-16-18 @ 13:16) (85 - 86)  BP: 120/78 (02-16-18 @ 13:16) (120/78 - 133/87)  RR: 16 (02-16-18 @ 13:16) (16 - 16)  SpO2: 100% (02-16-18 @ 13:16) (100% - 100%)

## 2018-02-16 NOTE — H&P ADULT - HISTORY OF PRESENT ILLNESS
71 yo F with a PMHx of CVA, HTN, breast cancer (s/p surgery), HTN, History of Hepatitis B virus infection, Arthritis R knee, presents to the ED c/o headache and dizziness that began this morning. Pt reported that she was lying in bed this morning when she felt dizzy and that the "room was spinning." Pt mentioned that she had a headache along with the other symptoms and when her  took her BP, it was 160/103. Pt then tried to take her BP pills but vomited when tried to swallow it. Pt had a similar episode three weeks ago and when she had a MRI of the brain done, her neurologist told her that she had a mini Stroke. As a result, she was put on aspirin 81 mg. She was scheduled to get a Carotid Doppler today but was cancelled since she is now in the Hospital. At the hospital, pt was given valium but vomited up the pill. Pt mentioned that after she had her mini Stroke, pt mentioned that she has this L leg weakness. She also mentioned that her R leg is weak but that is due to her arthritis of her R knee. Lastly, she mentioned that "sometimes" her chest hurts but it has been going on for two years since she had radiation from her Breast Cancer. Pt had her flu shot two months ago. Pt denies: sick contacts, recent travel, fever, chills, N/V/D, dysuria, changes in weight, changes in appetite, changes in bowel movements, abdominal pain, changes in vision, blurry vision, diplopia, muscle/joint pain.

## 2018-02-16 NOTE — ED PROVIDER NOTE - OBJECTIVE STATEMENT
73 y/o F with PMH of CVA, HTN, breast cancer (s/p surgery) p/w dizziness since this morning. Pt describes room spinning sensation, associated with nausea and vomiting nbnb x 3. Pt had similar episodes two weeks ago, self-resolved, had outpt MRI showing a CVA. Pt denies chest pain, abd pain, diarrhea, fevers, chills, numbness, weakness, vision changes.  Neuro: Gelacio

## 2018-02-16 NOTE — H&P ADULT - MUSCULOSKELETAL
details… detailed exam ROM intact/no joint erythema/no joint warmth/no calf tenderness/normal strength

## 2018-02-17 DIAGNOSIS — I69.398 OTHER SEQUELAE OF CEREBRAL INFARCTION: ICD-10-CM

## 2018-02-17 DIAGNOSIS — Z29.9 ENCOUNTER FOR PROPHYLACTIC MEASURES, UNSPECIFIED: ICD-10-CM

## 2018-02-17 LAB
BUN SERPL-MCNC: 10 MG/DL — SIGNIFICANT CHANGE UP (ref 7–23)
CALCIUM SERPL-MCNC: 9.2 MG/DL — SIGNIFICANT CHANGE UP (ref 8.4–10.5)
CHLORIDE SERPL-SCNC: 101 MMOL/L — SIGNIFICANT CHANGE UP (ref 98–107)
CHOLEST SERPL-MCNC: 178 MG/DL — SIGNIFICANT CHANGE UP (ref 120–199)
CO2 SERPL-SCNC: 26 MMOL/L — SIGNIFICANT CHANGE UP (ref 22–31)
CREAT SERPL-MCNC: 0.48 MG/DL — LOW (ref 0.5–1.3)
GLUCOSE SERPL-MCNC: 107 MG/DL — HIGH (ref 70–99)
HBA1C BLD-MCNC: 6 % — HIGH (ref 4–5.6)
HCT VFR BLD CALC: 42 % — SIGNIFICANT CHANGE UP (ref 34.5–45)
HDLC SERPL-MCNC: 48 MG/DL — SIGNIFICANT CHANGE UP (ref 45–65)
HGB BLD-MCNC: 14.4 G/DL — SIGNIFICANT CHANGE UP (ref 11.5–15.5)
LIPID PNL WITH DIRECT LDL SERPL: 112 MG/DL — SIGNIFICANT CHANGE UP
MCHC RBC-ENTMCNC: 31.7 PG — SIGNIFICANT CHANGE UP (ref 27–34)
MCHC RBC-ENTMCNC: 34.3 % — SIGNIFICANT CHANGE UP (ref 32–36)
MCV RBC AUTO: 92.5 FL — SIGNIFICANT CHANGE UP (ref 80–100)
NRBC # FLD: 0 — SIGNIFICANT CHANGE UP
PLATELET # BLD AUTO: 268 K/UL — SIGNIFICANT CHANGE UP (ref 150–400)
PMV BLD: 8.9 FL — SIGNIFICANT CHANGE UP (ref 7–13)
POTASSIUM SERPL-MCNC: 4 MMOL/L — SIGNIFICANT CHANGE UP (ref 3.5–5.3)
POTASSIUM SERPL-SCNC: 4 MMOL/L — SIGNIFICANT CHANGE UP (ref 3.5–5.3)
RBC # BLD: 4.54 M/UL — SIGNIFICANT CHANGE UP (ref 3.8–5.2)
RBC # FLD: 12.2 % — SIGNIFICANT CHANGE UP (ref 10.3–14.5)
SODIUM SERPL-SCNC: 141 MMOL/L — SIGNIFICANT CHANGE UP (ref 135–145)
TRIGL SERPL-MCNC: 135 MG/DL — SIGNIFICANT CHANGE UP (ref 10–149)
TSH SERPL-MCNC: 1.11 UIU/ML — SIGNIFICANT CHANGE UP (ref 0.27–4.2)
WBC # BLD: 6.78 K/UL — SIGNIFICANT CHANGE UP (ref 3.8–10.5)
WBC # FLD AUTO: 6.78 K/UL — SIGNIFICANT CHANGE UP (ref 3.8–10.5)

## 2018-02-17 PROCEDURE — 99233 SBSQ HOSP IP/OBS HIGH 50: CPT

## 2018-02-17 RX ORDER — MECLIZINE HCL 12.5 MG
12.5 TABLET ORAL EVERY 8 HOURS
Qty: 0 | Refills: 0 | Status: DISCONTINUED | OUTPATIENT
Start: 2018-02-17 | End: 2018-02-23

## 2018-02-17 RX ADMIN — ENOXAPARIN SODIUM 40 MILLIGRAM(S): 100 INJECTION SUBCUTANEOUS at 21:29

## 2018-02-17 RX ADMIN — ATORVASTATIN CALCIUM 80 MILLIGRAM(S): 80 TABLET, FILM COATED ORAL at 21:29

## 2018-02-17 RX ADMIN — Medication 81 MILLIGRAM(S): at 12:02

## 2018-02-17 NOTE — PROGRESS NOTE ADULT - SUBJECTIVE AND OBJECTIVE BOX
Patient is a 72y old  Female who presents with a chief complaint of dizziness and headache x 1 day (2018 14:17)    SUBJECTIVE / OVERNIGHT EVENTS: Patient seen and examined. Currently, no complaints although she reports intermittent vertigo symptoms improved from admission. Denies any N/V or diarrhea.     Review of Systems: As per above     MEDICATIONS  (STANDING):  aspirin enteric coated 81 milliGRAM(s) Oral daily  atorvastatin 80 milliGRAM(s) Oral at bedtime  enoxaparin Injectable 40 milliGRAM(s) SubCutaneous every 24 hours    MEDICATIONS  (PRN):  acetaminophen   Tablet. 650 milliGRAM(s) Oral every 6 hours PRN mild, moderate pain  diazepam    Tablet 2 milliGRAM(s) Oral every 8 hours PRN Vertigo/Dizzines  meclizine 12.5 milliGRAM(s) Oral every 8 hours PRN Dizziness  ondansetron Injectable 4 milliGRAM(s) IV Push every 8 hours PRN Nausea and/or Vomiting    PHYSICAL EXAM:  Vital Signs Last 24 Hrs  T(C): 37.1 (2018 13:15), Max: 37.1 (2018 13:15)  T(F): 98.7 (2018 13:15), Max: 98.7 (2018 13:15)  HR: 83 (2018 13:15) (51 - 83)  BP: 135/74 (2018 13:15) (101/53 - 155/78)  BP(mean): --  RR: 18 (2018 13:15) (18 - 18)  SpO2: 99% (2018 13:15) (97% - 100%)    GENERAL: NAD, well-developed  HEAD:  Atraumatic, Normocephalic  EYES: EOMI, PERRLA, conjunctiva and sclera clear  NECK: Supple, No JVD  CHEST/LUNG: Clear to auscultation bilaterally; No wheeze  HEART: Regular rate and rhythm; No murmurs, rubs, or gallops  ABDOMEN: Soft, Nontender, Nondistended; Bowel sounds present  EXTREMITIES:  2+ Peripheral Pulses, No clubbing, cyanosis, or edema  PSYCH: AAOx3  NEUROLOGY: non-focal    LABS:  CAPILLARY BLOOD GLUCOSE                        14.4   6.78  )-----------( 268      ( 2018 07:00 )             42.0         141  |  101  |  10  ----------------------------<  107<H>  4.0   |  26  |  0.48<L>    Ca    9.2      2018 07:00    TPro  7.9  /  Alb  4.3  /  TBili  0.6  /  DBili  x   /  AST  24  /  ALT  31  /  AlkPhos  97  02-16    PT/INR - ( 2018 09:55 )   PT: 12.2 SEC;   INR: 1.06          PTT - ( 2018 09:55 )  PTT:31.4 SEC      Urinalysis Basic - ( 2018 11:00 )    Color: YELLOW / Appearance: CLEAR / S.017 / pH: 6.5  Gluc: NEGATIVE / Ketone: NEGATIVE  / Bili: NEGATIVE / Urobili: NORMAL mg/dL   Blood: TRACE / Protein: 20 mg/dL / Nitrite: NEGATIVE   Leuk Esterase: SMALL / RBC: 5-10 / WBC 5-10   Sq Epi: OCC / Non Sq Epi: x / Bacteria: FEW      RADIOLOGY & ADDITIONAL TESTS:    Imaging Personally Reviewed:    < from: MR Angio Neck No Cont (18 @ 17:25) >  IMPRESSION:      1. Minimal focus of diffusion hyperintensity in the right corona radiata   suggests small punctate focus of acute ischemia. Questionable second area   in the left frontal parasagittal region. Extensive white matter ischemic   changes involving the periventricular and subcortical white matter..    2. marked a of both the neck and [place] are technically limited   though at the intracranial level there does appear to be intracranial   vascular compromise with an stenosis in the right M1 greater than the   left M1 though present bilaterally as well as stenosis in the proximal   right A2 that all appear significant. Correlation with CTA is suggested   for confirmation as these vascular studies are technically limited    < end of copied text >    Consultant(s) Notes Reviewed:  Neuro    Care Discussed with Consultants/Other Providers:

## 2018-02-17 NOTE — PHYSICAL THERAPY INITIAL EVALUATION ADULT - PLANNED THERAPY INTERVENTIONS, PT EVAL
balance training/bed mobility training/stairs training/gait training/transfer training/strengthening

## 2018-02-17 NOTE — PHYSICAL THERAPY INITIAL EVALUATION ADULT - PERTINENT HX OF CURRENT PROBLEM, REHAB EVAL
pt is 57 y/o male admitted with chest pain and low blood sugar. pt is 71 y/o female admitted with dizziness and headache.

## 2018-02-17 NOTE — PHYSICAL THERAPY INITIAL EVALUATION ADULT - ADDITIONAL COMMENTS
pt report lives with sister in house, 5 steps to enter the basement, amb with a cane. pt report lives with  and son in house, 4 steps to enter, amb with a cane,.

## 2018-02-17 NOTE — PROGRESS NOTE ADULT - ASSESSMENT
71 yo F with a PMHx of CVA, HTN, breast cancer (s/p surgery), HTN, History of Hepatitis B virus infection, Arthritis R knee, presents to the ED c/o headache and dizziness that began this morning, with left lower lobe infarct (indeterminate age), admitted to telemetry found to have acute right CVA.

## 2018-02-18 LAB
BUN SERPL-MCNC: 8 MG/DL — SIGNIFICANT CHANGE UP (ref 7–23)
CALCIUM SERPL-MCNC: 8.5 MG/DL — SIGNIFICANT CHANGE UP (ref 8.4–10.5)
CHLORIDE SERPL-SCNC: 101 MMOL/L — SIGNIFICANT CHANGE UP (ref 98–107)
CO2 SERPL-SCNC: 26 MMOL/L — SIGNIFICANT CHANGE UP (ref 22–31)
CREAT SERPL-MCNC: 0.5 MG/DL — SIGNIFICANT CHANGE UP (ref 0.5–1.3)
GLUCOSE SERPL-MCNC: 105 MG/DL — HIGH (ref 70–99)
HCT VFR BLD CALC: 42.2 % — SIGNIFICANT CHANGE UP (ref 34.5–45)
HGB BLD-MCNC: 14.8 G/DL — SIGNIFICANT CHANGE UP (ref 11.5–15.5)
MAGNESIUM SERPL-MCNC: 2 MG/DL — SIGNIFICANT CHANGE UP (ref 1.6–2.6)
MCHC RBC-ENTMCNC: 32.4 PG — SIGNIFICANT CHANGE UP (ref 27–34)
MCHC RBC-ENTMCNC: 35.1 % — SIGNIFICANT CHANGE UP (ref 32–36)
MCV RBC AUTO: 92.3 FL — SIGNIFICANT CHANGE UP (ref 80–100)
NRBC # FLD: 0 — SIGNIFICANT CHANGE UP
PLATELET # BLD AUTO: 241 K/UL — SIGNIFICANT CHANGE UP (ref 150–400)
PMV BLD: 9 FL — SIGNIFICANT CHANGE UP (ref 7–13)
POTASSIUM SERPL-MCNC: 3.7 MMOL/L — SIGNIFICANT CHANGE UP (ref 3.5–5.3)
POTASSIUM SERPL-SCNC: 3.7 MMOL/L — SIGNIFICANT CHANGE UP (ref 3.5–5.3)
RBC # BLD: 4.57 M/UL — SIGNIFICANT CHANGE UP (ref 3.8–5.2)
RBC # FLD: 12.5 % — SIGNIFICANT CHANGE UP (ref 10.3–14.5)
SODIUM SERPL-SCNC: 139 MMOL/L — SIGNIFICANT CHANGE UP (ref 135–145)
WBC # BLD: 5.56 K/UL — SIGNIFICANT CHANGE UP (ref 3.8–10.5)
WBC # FLD AUTO: 5.56 K/UL — SIGNIFICANT CHANGE UP (ref 3.8–10.5)

## 2018-02-18 PROCEDURE — 99233 SBSQ HOSP IP/OBS HIGH 50: CPT

## 2018-02-18 RX ADMIN — ENOXAPARIN SODIUM 40 MILLIGRAM(S): 100 INJECTION SUBCUTANEOUS at 21:50

## 2018-02-18 RX ADMIN — ATORVASTATIN CALCIUM 80 MILLIGRAM(S): 80 TABLET, FILM COATED ORAL at 21:50

## 2018-02-18 RX ADMIN — Medication 81 MILLIGRAM(S): at 11:36

## 2018-02-18 NOTE — OCCUPATIONAL THERAPY INITIAL EVALUATION ADULT - LIVES WITH, PROFILE
Pt. reports she lives with her  and son in a house with 4 steps to enter. Once inside, pt. reports bedroom and bathroom are located on the main level. Per pt., she has a bathtub in her bathroom with shower chair available.

## 2018-02-18 NOTE — OCCUPATIONAL THERAPY INITIAL EVALUATION ADULT - PERTINENT HX OF CURRENT PROBLEM, REHAB EVAL
Pt is a 72 year old Female with a PMHx of CVA, HTN, breast cancer (s/p surgery), HTN, History of Hepatitis B virus infection, & Arthritis R knee, who presented to [facility] on 2/16/18 with c/o headache and dizziness. CT Head No Contrast on 2/16/18 displayed left frontal lobe infarct of indeterminate age. No acute intracranial hemorrhage. No midline shift. Mild-moderate chronic microvascular cerebral white matter changes.

## 2018-02-18 NOTE — PROGRESS NOTE ADULT - SUBJECTIVE AND OBJECTIVE BOX
Patient is a 72y old  Female who presents with a chief complaint of dizziness and headache x 1 day (16 Feb 2018 14:17)    SUBJECTIVE / OVERNIGHT EVENTS: Patient seen and examined. Currently, no vertigo symptoms. Denies any N/V or diarrhea.     Review of Systems: As per above     MEDICATIONS  (STANDING):  aspirin enteric coated 81 milliGRAM(s) Oral daily  atorvastatin 80 milliGRAM(s) Oral at bedtime  enoxaparin Injectable 40 milliGRAM(s) SubCutaneous every 24 hours    MEDICATIONS  (PRN):  acetaminophen   Tablet. 650 milliGRAM(s) Oral every 6 hours PRN mild, moderate pain  diazepam    Tablet 2 milliGRAM(s) Oral every 8 hours PRN Vertigo/Dizzines  meclizine 12.5 milliGRAM(s) Oral every 8 hours PRN Dizziness  ondansetron Injectable 4 milliGRAM(s) IV Push every 8 hours PRN Nausea and/or Vomiting    PHYSICAL EXAM:  Vital Signs Last 24 Hrs  T(C): 36.8 (18 Feb 2018 05:22), Max: 37.1 (17 Feb 2018 13:15)  T(F): 98.3 (18 Feb 2018 05:22), Max: 98.7 (17 Feb 2018 13:15)  HR: 71 (18 Feb 2018 05:22) (64 - 83)  BP: 128/82 (18 Feb 2018 05:22) (106/64 - 135/74)  BP(mean): --  RR: 18 (18 Feb 2018 05:22) (16 - 18)  SpO2: 99% (18 Feb 2018 05:22) (98% - 99%)    GENERAL: NAD, well-developed  HEAD:  Atraumatic, Normocephalic  EYES: EOMI, PERRLA, conjunctiva and sclera clear  NECK: Supple, No JVD  CHEST/LUNG: Clear to auscultation bilaterally; No wheeze  HEART: Regular rate and rhythm; No murmurs, rubs, or gallops  ABDOMEN: Soft, Nontender, Nondistended; Bowel sounds present  EXTREMITIES:  2+ Peripheral Pulses, No clubbing, cyanosis, or edema  PSYCH: AAOx3  NEUROLOGY: non-focal    LABS:                        14.8   5.56  )-----------( 241      ( 18 Feb 2018 07:00 )             42.2   02-18    139  |  101  |  8   ----------------------------<  105<H>  3.7   |  26  |  0.50    Ca    8.5      18 Feb 2018 07:00  Mg     2.0     02-18        RADIOLOGY & ADDITIONAL TESTS:    Imaging Personally Reviewed:    < from: MR Angio Neck No Cont (02.16.18 @ 17:25) >  IMPRESSION:      1. Minimal focus of diffusion hyperintensity in the right corona radiata   suggests small punctate focus of acute ischemia. Questionable second area   in the left frontal parasagittal region. Extensive white matter ischemic   changes involving the periventricular and subcortical white matter..    2. marked a of both the neck and [place] are technically limited   though at the intracranial level there does appear to be intracranial   vascular compromise with an stenosis in the right M1 greater than the   left M1 though present bilaterally as well as stenosis in the proximal   right A2 that all appear significant. Correlation with CTA is suggested   for confirmation as these vascular studies are technically limited    < end of copied text >    Consultant(s) Notes Reviewed:  Neuro    Care Discussed with Consultants/Other Providers:

## 2018-02-18 NOTE — OCCUPATIONAL THERAPY INITIAL EVALUATION ADULT - GENERAL OBSERVATIONS, REHAB EVAL
Pt. received semisupine in bed. No acute distress. Patient agreed to evaluation from Occupational Therapist. +Heplock, +Tele. Visitor bedside.

## 2018-02-18 NOTE — OCCUPATIONAL THERAPY INITIAL EVALUATION ADULT - MD ORDER
Occupational Therapy (OT) to evaluate and treat. Occupational Therapy (OT) to evaluate and treat. Out of bed with assistance. Per PEYTON Loera, pt is okay to participate in OT evaluation and perform activity as tolerated.

## 2018-02-18 NOTE — OCCUPATIONAL THERAPY INITIAL EVALUATION ADULT - PLANNED THERAPY INTERVENTIONS, OT EVAL
balance training/transfer training/fine motor coordination training/neuromuscular re-education/ROM/ADL retraining/bed mobility training/strengthening

## 2018-02-19 LAB
BUN SERPL-MCNC: 8 MG/DL — SIGNIFICANT CHANGE UP (ref 7–23)
CALCIUM SERPL-MCNC: 9.2 MG/DL — SIGNIFICANT CHANGE UP (ref 8.4–10.5)
CHLORIDE SERPL-SCNC: 100 MMOL/L — SIGNIFICANT CHANGE UP (ref 98–107)
CO2 SERPL-SCNC: 24 MMOL/L — SIGNIFICANT CHANGE UP (ref 22–31)
CREAT SERPL-MCNC: 0.44 MG/DL — LOW (ref 0.5–1.3)
GLUCOSE SERPL-MCNC: 115 MG/DL — HIGH (ref 70–99)
HCT VFR BLD CALC: 43.4 % — SIGNIFICANT CHANGE UP (ref 34.5–45)
HGB BLD-MCNC: 14.9 G/DL — SIGNIFICANT CHANGE UP (ref 11.5–15.5)
MCHC RBC-ENTMCNC: 32 PG — SIGNIFICANT CHANGE UP (ref 27–34)
MCHC RBC-ENTMCNC: 34.3 % — SIGNIFICANT CHANGE UP (ref 32–36)
MCV RBC AUTO: 93.3 FL — SIGNIFICANT CHANGE UP (ref 80–100)
NRBC # FLD: 0 — SIGNIFICANT CHANGE UP
PLATELET # BLD AUTO: 260 K/UL — SIGNIFICANT CHANGE UP (ref 150–400)
PMV BLD: 9.1 FL — SIGNIFICANT CHANGE UP (ref 7–13)
POTASSIUM SERPL-MCNC: 3.6 MMOL/L — SIGNIFICANT CHANGE UP (ref 3.5–5.3)
POTASSIUM SERPL-SCNC: 3.6 MMOL/L — SIGNIFICANT CHANGE UP (ref 3.5–5.3)
RBC # BLD: 4.65 M/UL — SIGNIFICANT CHANGE UP (ref 3.8–5.2)
RBC # FLD: 12.3 % — SIGNIFICANT CHANGE UP (ref 10.3–14.5)
SODIUM SERPL-SCNC: 139 MMOL/L — SIGNIFICANT CHANGE UP (ref 135–145)
WBC # BLD: 6.79 K/UL — SIGNIFICANT CHANGE UP (ref 3.8–10.5)
WBC # FLD AUTO: 6.79 K/UL — SIGNIFICANT CHANGE UP (ref 3.8–10.5)

## 2018-02-19 PROCEDURE — 99233 SBSQ HOSP IP/OBS HIGH 50: CPT

## 2018-02-19 PROCEDURE — 72040 X-RAY EXAM NECK SPINE 2-3 VW: CPT | Mod: 26

## 2018-02-19 RX ADMIN — ATORVASTATIN CALCIUM 80 MILLIGRAM(S): 80 TABLET, FILM COATED ORAL at 21:19

## 2018-02-19 RX ADMIN — ENOXAPARIN SODIUM 40 MILLIGRAM(S): 100 INJECTION SUBCUTANEOUS at 21:20

## 2018-02-19 RX ADMIN — Medication 81 MILLIGRAM(S): at 11:15

## 2018-02-19 NOTE — PROGRESS NOTE ADULT - ASSESSMENT
73 yo F with a PMHx of CVA, HTN, breast cancer (s/p surgery), HTN, History of Hepatitis B virus infection, Arthritis R knee, presents to the ED c/o headache and dizziness that began this morning, with left lower lobe infarct (indeterminate age), admitted to telemetry found to have acute right CVA.

## 2018-02-19 NOTE — PROGRESS NOTE ADULT - SUBJECTIVE AND OBJECTIVE BOX
Patient is a 72y old  Female who presents with a chief complaint of dizziness and headache x 1 day (16 Feb 2018 14:17)    SUBJECTIVE / OVERNIGHT EVENTS: Patient seen and examined. Currently, no vertigo symptoms. Denies any N/V or diarrhea.     Review of Systems: As per above     MEDICATIONS  (STANDING):  aspirin enteric coated 81 milliGRAM(s) Oral daily  atorvastatin 80 milliGRAM(s) Oral at bedtime  enoxaparin Injectable 40 milliGRAM(s) SubCutaneous every 24 hours    MEDICATIONS  (PRN):  acetaminophen   Tablet. 650 milliGRAM(s) Oral every 6 hours PRN mild, moderate pain  diazepam    Tablet 2 milliGRAM(s) Oral every 8 hours PRN Vertigo/Dizzines  meclizine 12.5 milliGRAM(s) Oral every 8 hours PRN Dizziness  ondansetron Injectable 4 milliGRAM(s) IV Push every 8 hours PRN Nausea and/or Vomiting    PHYSICAL EXAM:  Vital Signs Last 24 Hrs  T(C): 36.6 (19 Feb 2018 06:36), Max: 37 (18 Feb 2018 21:30)  T(F): 97.9 (19 Feb 2018 06:36), Max: 98.6 (18 Feb 2018 21:30)  HR: 84 (19 Feb 2018 06:36) (84 - 92)  BP: 123/84 (19 Feb 2018 06:36) (123/84 - 139/84)  BP(mean): --  RR: 18 (19 Feb 2018 06:36) (18 - 18)  SpO2: 98% (19 Feb 2018 06:36) (96% - 98%)    GENERAL: NAD, well-developed  HEAD:  Atraumatic, Normocephalic  CHEST/LUNG: Clear to auscultation bilaterally; No wheeze  HEART: Regular rate and rhythm; No murmurs, rubs, or gallops  ABDOMEN: Soft, Nontender, Nondistended; Bowel sounds present  EXTREMITIES:  2+ Peripheral Pulses, No clubbing, cyanosis, or edema  PSYCH: AAOx3  NEUROLOGY: non-focal    LABS:                                   14.9   6.79  )-----------( 260      ( 19 Feb 2018 06:00 )             43.4         RADIOLOGY & ADDITIONAL TESTS:    Imaging Personally Reviewed:    < from: MR Angio Neck No Cont (02.16.18 @ 17:25) >  IMPRESSION:      1. Minimal focus of diffusion hyperintensity in the right corona radiata   suggests small punctate focus of acute ischemia. Questionable second area   in the left frontal parasagittal region. Extensive white matter ischemic   changes involving the periventricular and subcortical white matter..    2. marked a of both the neck and [place] of [place] are technically limited   though at the intracranial level there does appear to be intracranial   vascular compromise with an stenosis in the right M1 greater than the   left M1 though present bilaterally as well as stenosis in the proximal   right A2 that all appear significant. Correlation with CTA is suggested   for confirmation as these vascular studies are technically limited    < end of copied text >    Consultant(s) Notes Reviewed:  Neuro    Care Discussed with Consultants/Other Providers:

## 2018-02-20 ENCOUNTER — TRANSCRIPTION ENCOUNTER (OUTPATIENT)
Age: 73
End: 2018-02-20

## 2018-02-20 LAB
BUN SERPL-MCNC: 8 MG/DL — SIGNIFICANT CHANGE UP (ref 7–23)
CALCIUM SERPL-MCNC: 9.3 MG/DL — SIGNIFICANT CHANGE UP (ref 8.4–10.5)
CHLORIDE SERPL-SCNC: 103 MMOL/L — SIGNIFICANT CHANGE UP (ref 98–107)
CO2 SERPL-SCNC: 25 MMOL/L — SIGNIFICANT CHANGE UP (ref 22–31)
CREAT SERPL-MCNC: 0.49 MG/DL — LOW (ref 0.5–1.3)
GLUCOSE SERPL-MCNC: 97 MG/DL — SIGNIFICANT CHANGE UP (ref 70–99)
POTASSIUM SERPL-MCNC: 3.9 MMOL/L — SIGNIFICANT CHANGE UP (ref 3.5–5.3)
POTASSIUM SERPL-SCNC: 3.9 MMOL/L — SIGNIFICANT CHANGE UP (ref 3.5–5.3)
SODIUM SERPL-SCNC: 144 MMOL/L — SIGNIFICANT CHANGE UP (ref 135–145)

## 2018-02-20 PROCEDURE — 99233 SBSQ HOSP IP/OBS HIGH 50: CPT

## 2018-02-20 PROCEDURE — 93306 TTE W/DOPPLER COMPLETE: CPT | Mod: 26

## 2018-02-20 RX ORDER — CLOPIDOGREL BISULFATE 75 MG/1
75 TABLET, FILM COATED ORAL DAILY
Qty: 0 | Refills: 0 | Status: DISCONTINUED | OUTPATIENT
Start: 2018-02-20 | End: 2018-02-23

## 2018-02-20 RX ADMIN — CLOPIDOGREL BISULFATE 75 MILLIGRAM(S): 75 TABLET, FILM COATED ORAL at 14:40

## 2018-02-20 RX ADMIN — ATORVASTATIN CALCIUM 80 MILLIGRAM(S): 80 TABLET, FILM COATED ORAL at 22:03

## 2018-02-20 RX ADMIN — Medication 81 MILLIGRAM(S): at 11:26

## 2018-02-20 RX ADMIN — ENOXAPARIN SODIUM 40 MILLIGRAM(S): 100 INJECTION SUBCUTANEOUS at 22:03

## 2018-02-20 NOTE — DISCHARGE NOTE ADULT - PROVIDER TOKENS
FREE:[LAST:[Follow up],PHONE:[(   )    -],FAX:[(   )    -],ADDRESS:[with Neurology and Cardiology within 1 week]]

## 2018-02-20 NOTE — DISCHARGE NOTE ADULT - PATIENT PORTAL LINK FT
You can access the [facility] Patient Portal, offered by [facility], by registering with the following website: [email or web address]

## 2018-02-20 NOTE — DISCHARGE NOTE ADULT - MEDICATION SUMMARY - MEDICATIONS TO TAKE
I will START or STAY ON the medications listed below when I get home from the hospital:    aspirin 81 mg oral delayed release tablet  -- 1 tab(s) by mouth once a day  -- Indication: For blood thinner    meclizine 12.5 mg oral tablet  -- 1 tab(s) by mouth every 8 hours, As needed, Dizziness  -- Indication: For dizziness    atorvastatin 80 mg oral tablet  -- 1 tab(s) by mouth once a day (at bedtime)  -- Indication: For Cholesterol    clopidogrel 75 mg oral tablet  -- 1 tab(s) by mouth once a day  -- Indication: For blood thinner

## 2018-02-20 NOTE — DISCHARGE NOTE ADULT - PLAN OF CARE
no further incidents Seek medical attention for weakness or trouble speaking or seeing Continue medications as prescribed Be careful ambulating and sitting to standing  Continue medications as prescribed

## 2018-02-20 NOTE — DISCHARGE NOTE ADULT - MEDICATION SUMMARY - MEDICATIONS TO STOP TAKING
I will STOP taking the medications listed below when I get home from the hospital:    famotidine 20 mg oral tablet  --  by mouth as per ASU protocol    Vitamin D2 50,000 intl units (1.25 mg) oral capsule  --  by mouth once a day

## 2018-02-20 NOTE — DISCHARGE NOTE ADULT - HOSPITAL COURSE
73 y/o female, with a PmHx of CVA, Breast Ca s/p lumpectomy, HTN, presented with dizziness. Pt recently had an outpatient MRI done 2 weeks ago showed a CVA. Pt is being admitted to telemetry for r/o cva.    She was treated for:  + Dizziness - NIHSS: 0, permissive htn x 24 hours,   and Meclizine  + Left and right hemispheric acute strokes by MRI/A H&N, MRI shows intracranial vascular compromise; per discussion with neuro, this is not likely the etiology of her strokes given distribution. Strokes more likely due to cardioembolic event.       Testing showed:  EKG: SR@ 80 bpm + LVH + LAD  Glucose: 120                  Lactate: 2.4              UA - small leuks, tace blood  2/16 CT Head - left frontal lobe infarct of indeterminate age. If clinically warranted recommended brain MRI if not contraindicated or short-term follow up head CT. No acute intracranial hemorrahge. No midline shift. Mild-moderate chronic microvascular cerebral white matter changes.  2/16- MRI/A Head and Neck- Minimal focus of diffusion hyperintensity in the right corona radiata suggests small punctate focus of acute ischemia. Questionable second area in the left frontal parasagittal region. Extensive white matter ischemic changes involving the periventricular and subcortical white matter. 2. marked a of both the neck and [place] of [place] are technically limited though at the intracranial level there does appear to be intracranial vascular compromise with an stenosis in the right M1 greater than the left M1 though present bilaterally as well as stenosis in the proximal right A2 that all appear significant. Correlation with CTA is suggested for confirmation as these vascular studies are technically limited.  2/20 ECHO:  Technically difficult study.  Mitral annular calcification, otherwise normal mitral  valve. Minimal mitral regurgitation. . Endocardium not well visualized; grossly normal left  ventricular systolic function.  Endocardial visualization enhanced with intravenous injection of echo contrast (Definity). Unable to accurately evaluate right ventricular size or  systolic function. No obvious cardiac source of embolus was identified on this  transthoracic study.  If clinical suspicion is high, consider JORGE for further evaluation.    Plavix was added to her regimine   No need for JORGE/ILR     Pt did not get approved for Acute rehab  Peer to peer done , Dr. Bacon spoke to Dr Guardado, pt does not qualify for acute rehab as does not need 3/week MD visit , but approved for [facility] planning to subacute rehab  pt medically stable for discharge .    Stable discharge to Subacute Rehab on 2/23/18.

## 2018-02-20 NOTE — PROGRESS NOTE ADULT - ASSESSMENT
Incomplete  72 RHD y/o woman pmh recently diagnosed HTN and recently diagnosed CVA unknown location she p/w 'room spinning' sensation that began upon awakening this am. Dizziness a/w HA and nausea.  CRUZ described a throbbing posterior HA. She was recently diagnosed with a stroke on MRI but unable to state cva's location.  MRI and MRA show a a punctate R corona radiata, acute to subacute, and bilateral M1 and R A1 atenosis  Impression: R [place] radiata subacute to chronic stroke, irrelevant to the patient's symptoms + Intracranial large artery stenosis  Plan:  [] C/W ASA 81 daily  [] Start and c/w plavix for 3 m per [facility], if there are no other contraindications  [] Normotensive as of today  [] TTE with bubble study +/- Loop as an in/outpatient  [] A1c and LDL level  [] statin (goal LDL <100)  [] PT/OT  [] patient can be discharged from neurovascular stand point

## 2018-02-20 NOTE — PROGRESS NOTE ADULT - SUBJECTIVE AND OBJECTIVE BOX
Incomplete  S: The patient is     O:  Vital Signs Last 24 Hrs  T(C): 36.9 (20 Feb 2018 06:29), Max: 36.9 (20 Feb 2018 06:29)  T(F): 98.5 (20 Feb 2018 06:29), Max: 98.5 (20 Feb 2018 06:29)  HR: 83 (20 Feb 2018 06:29) (83 - 107)  BP: 122/84 (20 Feb 2018 06:29) (122/84 - 136/88)  BP(mean): --  RR: 16 (20 Feb 2018 06:29) (16 - 18)  SpO2: 99% (20 Feb 2018 06:29) (99% - 99%)    MEDICATIONS  (STANDING):  aspirin enteric coated 81 milliGRAM(s) Oral daily  atorvastatin 80 milliGRAM(s) Oral at bedtime  enoxaparin Injectable 40 milliGRAM(s) SubCutaneous every 24 hours    MEDICATIONS  (PRN):  acetaminophen   Tablet. 650 milliGRAM(s) Oral every 6 hours PRN mild, moderate pain  diazepam    Tablet 2 milliGRAM(s) Oral every 8 hours PRN Vertigo/Dizzines  meclizine 12.5 milliGRAM(s) Oral every 8 hours PRN Dizziness  ondansetron Injectable 4 milliGRAM(s) IV Push every 8 hours PRN Nausea and/or Vomiting                          14.9   6.79  )-----------( 260      ( 19 Feb 2018 06:00 )             43.4   02-19    139  |  100  |  8   ----------------------------<  115<H>  3.6   |  24  |  0.44<L>    Ca    9.2      19 Feb 2018 06:00    < from: MR Head No Cont (02.16.18 @ 17:19) >  IMPRESSION:      1. Minimal focus of diffusion hyperintensity in the right corona radiata   suggests small punctate focus of acute ischemia. Questionable second area   in the left frontal parasagittal region. Extensive white matter ischemic   changes involving the periventricular and subcortical white matter..    2. marked a of both the neck and [place] of [place] are technically limited   though at the intracranial level there does appear to be intracranial   vascular compromise with an stenosis in the right M1 greater than the   left M1 though present bilaterally as well as stenosis in the proximal   right A2 that all appear significant. Correlation with CTA is suggested   for confirmation as these vascular studies are technically limited    < end of copied text >

## 2018-02-20 NOTE — DISCHARGE NOTE ADULT - CARE PLAN
Principal Discharge DX:	Cerebrovascular accident (CVA), unspecified mechanism  Goal:	no further incidents  Assessment and plan of treatment:	Seek medical attention for weakness or trouble speaking or seeing  Secondary Diagnosis:	Hypertension  Assessment and plan of treatment:	Continue medications as prescribed  Secondary Diagnosis:	Vertigo following cerebrovascular accident (CVA)  Assessment and plan of treatment:	Be careful ambulating and sitting to standing  Continue medications as prescribed

## 2018-02-20 NOTE — PROGRESS NOTE ADULT - SUBJECTIVE AND OBJECTIVE BOX
Patient is a 72y old  Female who presents with a chief complaint of dizziness and headache x 1 day (20 Feb 2018 09:45)      SUBJECTIVE / OVERNIGHT EVENTS: patient seen and examined by bedside, no acute distress noted, denies headache, dizziness, SOB, CP, Palpitations N/V/D, abdominal pain          MEDICATIONS  (STANDING):  aspirin enteric coated 81 milliGRAM(s) Oral daily  atorvastatin 80 milliGRAM(s) Oral at bedtime  enoxaparin Injectable 40 milliGRAM(s) SubCutaneous every 24 hours    MEDICATIONS  (PRN):  acetaminophen   Tablet. 650 milliGRAM(s) Oral every 6 hours PRN mild, moderate pain  diazepam    Tablet 2 milliGRAM(s) Oral every 8 hours PRN Vertigo/Dizzines  meclizine 12.5 milliGRAM(s) Oral every 8 hours PRN Dizziness  ondansetron Injectable 4 milliGRAM(s) IV Push every 8 hours PRN Nausea and/or Vomiting      Vital Signs Last 24 Hrs  T(C): 36.9 (20 Feb 2018 06:29), Max: 36.9 (20 Feb 2018 06:29)  T(F): 98.5 (20 Feb 2018 06:29), Max: 98.5 (20 Feb 2018 06:29)  HR: 83 (20 Feb 2018 06:29) (83 - 107)  BP: 122/84 (20 Feb 2018 06:29) (122/84 - 136/88)  BP(mean): --  RR: 16 (20 Feb 2018 06:29) (16 - 18)  SpO2: 99% (20 Feb 2018 06:29) (99% - 99%)  CAPILLARY BLOOD GLUCOSE        I&O's Summary      PHYSICAL EXAM:  GENERAL: NAD, well-developed  HEAD:  Atraumatic, Normocephalic  EYES: EOMI, PERRLA, conjunctiva and sclera clear  NECK: Supple,   CHEST/LUNG: Clear to auscultation bilaterally; No wheeze  HEART: Regular rate and rhythm;  ABDOMEN: Soft, Nontender, Nondistended; Bowel sounds present  EXTREMITIES:  2+ Peripheral Pulses, No clubbing, cyanosis, or edema  PSYCH: AAOx3  NEUROLOGY: non-focal  SKIN: No rashes or lesions    LABS:                        14.9   6.79  )-----------( 260      ( 19 Feb 2018 06:00 )             43.4     02-20    144  |  103  |  8   ----------------------------<  97  3.9   |  25  |  0.49<L>    Ca    9.3      20 Feb 2018 06:55                RADIOLOGY & ADDITIONAL TESTS:    Imaging Personally Reviewed:    Consultant(s) Notes Reviewed:  neurology     Care Discussed with Consultants/Other Providers:

## 2018-02-21 LAB
BUN SERPL-MCNC: 7 MG/DL — SIGNIFICANT CHANGE UP (ref 7–23)
CALCIUM SERPL-MCNC: 9.1 MG/DL — SIGNIFICANT CHANGE UP (ref 8.4–10.5)
CHLORIDE SERPL-SCNC: 102 MMOL/L — SIGNIFICANT CHANGE UP (ref 98–107)
CO2 SERPL-SCNC: 25 MMOL/L — SIGNIFICANT CHANGE UP (ref 22–31)
CREAT SERPL-MCNC: 0.42 MG/DL — LOW (ref 0.5–1.3)
GLUCOSE SERPL-MCNC: 103 MG/DL — HIGH (ref 70–99)
HCT VFR BLD CALC: 42.6 % — SIGNIFICANT CHANGE UP (ref 34.5–45)
HGB BLD-MCNC: 14.5 G/DL — SIGNIFICANT CHANGE UP (ref 11.5–15.5)
MCHC RBC-ENTMCNC: 31.7 PG — SIGNIFICANT CHANGE UP (ref 27–34)
MCHC RBC-ENTMCNC: 34 % — SIGNIFICANT CHANGE UP (ref 32–36)
MCV RBC AUTO: 93.2 FL — SIGNIFICANT CHANGE UP (ref 80–100)
NRBC # FLD: 0 — SIGNIFICANT CHANGE UP
PLATELET # BLD AUTO: 261 K/UL — SIGNIFICANT CHANGE UP (ref 150–400)
PMV BLD: 9.1 FL — SIGNIFICANT CHANGE UP (ref 7–13)
POTASSIUM SERPL-MCNC: 3.7 MMOL/L — SIGNIFICANT CHANGE UP (ref 3.5–5.3)
POTASSIUM SERPL-SCNC: 3.7 MMOL/L — SIGNIFICANT CHANGE UP (ref 3.5–5.3)
RBC # BLD: 4.57 M/UL — SIGNIFICANT CHANGE UP (ref 3.8–5.2)
RBC # FLD: 12.3 % — SIGNIFICANT CHANGE UP (ref 10.3–14.5)
SODIUM SERPL-SCNC: 141 MMOL/L — SIGNIFICANT CHANGE UP (ref 135–145)
WBC # BLD: 7.22 K/UL — SIGNIFICANT CHANGE UP (ref 3.8–10.5)
WBC # FLD AUTO: 7.22 K/UL — SIGNIFICANT CHANGE UP (ref 3.8–10.5)

## 2018-02-21 PROCEDURE — 99233 SBSQ HOSP IP/OBS HIGH 50: CPT

## 2018-02-21 PROCEDURE — 99222 1ST HOSP IP/OBS MODERATE 55: CPT

## 2018-02-21 RX ADMIN — Medication 81 MILLIGRAM(S): at 12:18

## 2018-02-21 RX ADMIN — ATORVASTATIN CALCIUM 80 MILLIGRAM(S): 80 TABLET, FILM COATED ORAL at 22:07

## 2018-02-21 RX ADMIN — CLOPIDOGREL BISULFATE 75 MILLIGRAM(S): 75 TABLET, FILM COATED ORAL at 16:41

## 2018-02-21 RX ADMIN — ENOXAPARIN SODIUM 40 MILLIGRAM(S): 100 INJECTION SUBCUTANEOUS at 22:08

## 2018-02-21 NOTE — PROGRESS NOTE ADULT - SUBJECTIVE AND OBJECTIVE BOX
Patient is a 72y old  Female who presents with a chief complaint of dizziness and headache x 1 day (20 Feb 2018 09:45)      SUBJECTIVE / OVERNIGHT EVENTS: patient seen and examined by bedside, no acute complain      MEDICATIONS  (STANDING):  aspirin enteric coated 81 milliGRAM(s) Oral daily  atorvastatin 80 milliGRAM(s) Oral at bedtime  clopidogrel Tablet 75 milliGRAM(s) Oral daily  enoxaparin Injectable 40 milliGRAM(s) SubCutaneous every 24 hours    MEDICATIONS  (PRN):  acetaminophen   Tablet. 650 milliGRAM(s) Oral every 6 hours PRN mild, moderate pain  diazepam    Tablet 2 milliGRAM(s) Oral every 8 hours PRN Vertigo/Dizzines  meclizine 12.5 milliGRAM(s) Oral every 8 hours PRN Dizziness  ondansetron Injectable 4 milliGRAM(s) IV Push every 8 hours PRN Nausea and/or Vomiting      Vital Signs Last 24 Hrs  T(C): 37.3 (21 Feb 2018 13:02), Max: 37.3 (21 Feb 2018 13:02)  T(F): 99.2 (21 Feb 2018 13:02), Max: 99.2 (21 Feb 2018 13:02)  HR: 97 (21 Feb 2018 13:02) (91 - 105)  BP: 133/95 (21 Feb 2018 13:02) (106/73 - 133/95)  BP(mean): --  RR: 18 (21 Feb 2018 13:02) (16 - 18)  SpO2: 96% (21 Feb 2018 13:02) (96% - 99%)  CAPILLARY BLOOD GLUCOSE        I&O's Summary      GENERAL: NAD, well-developed  HEAD:  Atraumatic, Normocephalic  EYES: EOMI, PERRLA, conjunctiva and sclera clear  NECK: Supple,   CHEST/LUNG: Clear to auscultation bilaterally; No wheeze  HEART: Regular rate and rhythm;  ABDOMEN: Soft, Nontender, Nondistended; Bowel sounds present  EXTREMITIES:  2+ Peripheral Pulses, No clubbing, cyanosis, or edema  PSYCH: AAOx3  NEUROLOGY: non-focal  SKIN: No rashes or lesions        LABS:                        14.5   7.22  )-----------( 261      ( 21 Feb 2018 06:45 )             42.6     02-21    141  |  102  |  7   ----------------------------<  103<H>  3.7   |  25  |  0.42<L>    Ca    9.1      21 Feb 2018 06:45                RADIOLOGY & ADDITIONAL TESTS:< from: TTE with Doppler (w/Cont) (02.20.18 @ 16:24) >  CONCLUSIONS:  Technically difficult study.  1. Mitral annular calcification, otherwise normal mitral  valve. Minimal mitral regurgitation.  2. Endocardium not well visualized; grossly normal left  ventricular systolic function.  Endocardial visualization  enhanced with intravenous injection of echo contrast  (Definity).  3. Unable to accurately evaluate right ventricular size or  systolic function.  No obvious cardiac source of embolus was identified on this  transthoracic study.  If clinical suspicion is high,  consider JORGE for further evaluation.    < end of copied text >      Imaging Personally Reviewed:    Consultant(s) Notes Reviewed:  neurology , PM&R    Care Discussed with Consultants/Other Providers: neurology

## 2018-02-21 NOTE — CONSULT NOTE ADULT - SUBJECTIVE AND OBJECTIVE BOX
Patient is a 72y old  Female who presented to the [facility] ED on 2/16/2018 with a chief complaint of dizziness and headache x 1 day    HPI per primary team(s):  73 yo F with a PMHx of CVA, HTN, breast cancer (s/p surgery), HTN, History of Hepatitis B virus infection, Arthritis R knee, presents to the ED c/o headache and dizziness that began this morning. Pt reported that she was lying in bed this morning when she felt dizzy and that the "room was spinning." Pt mentioned that she had a headache along with the other symptoms and when her  took her BP, it was 160/103. Pt then tried to take her BP pills but vomited when tried to swallow it. Pt had a similar episode three weeks ago and when she had a MRI of the brain done, her neurologist told her that she had a mini Stroke. As a result, she was put on aspirin 81 mg. She had been scheduled to get a Carotid Doppler but was cancelled since she is now in the Hospital. At the hospital, pt was given valium but vomited up the pill. Pt mentioned that after she had her mini Stroke, pt mentioned that she has this L leg weakness. She also mentioned that her R leg is weak but that is due to her arthritis of her R knee. Lastly, she mentioned that "sometimes" her chest hurts but it has been going on for two years since she had radiation from her Breast Cancer. Pt had her flu shot two months ago. Pt denies: sick contacts, recent travel, fever, chills, N/V/D, dysuria, changes in weight, changes in appetite, changes in bowel movements, abdominal pain, changes in vision, blurry vision, diplopia, muscle/joint pain.    PM&R additional HPI:  Pt participated with PT on 2/17/18 and was min A with transfers.  She ambulated with a RW at min A x 5 steps.  She has been tolerating an oral diet.  Her headaches and dizziness have improved.  Yesterday, she was able to walk with nsg assist from the bed to the bathroom in the room, approx 20 feet, and felt a little better, but felt unsteady.  She did undergo a TTE 2/20/18, which was a technically limited study, but no cardioembolic source was grossly observed.    REVIEW OF SYSTEMS: No chest pain, shortness of breath, nausea, vomiting or diarrhea.  Denies bowel or bladder dysfunction.    PAST MEDICAL & SURGICAL HISTORY  CVA (cerebral vascular accident)  Arthritis  Breast cancer  History of hepatitis B virus infection  Hypertension  S/P lumpectomy, left breast, lymph node resection    SOCIAL HISTORY  Smoking - Denied, EtOH - Denied, Drugs - Denied  Lives with  in private home in [place], [place].  [address], 1st floor set up.    FUNCTIONAL HISTORY:   Independent with self-care and ambulation with a cane at baseline.    FAMILY HISTORY   Family history of hypertension (Sibling)  Family history of stroke (Father)    RECENT LABS/IMAGING  CBC Full  -  ( 21 Feb 2018 06:45 )  WBC Count : 7.22 K/uL  Hemoglobin : 14.5 g/dL  Hematocrit : 42.6 %  Platelet Count - Automated : 261 K/uL  Mean Cell Volume : 93.2 fL  Mean Cell Hemoglobin : 31.7 pg  Mean Cell Hemoglobin Concentration : 34.0 %  Auto Neutrophil # : x  Auto Lymphocyte # : x  Auto Monocyte # : x  Auto Eosinophil # : x  Auto Basophil # : x  Auto Neutrophil % : x  Auto Lymphocyte % : x  Auto Monocyte % : x  Auto Eosinophil % : x  Auto Basophil % : x    02-21    141  |  102  |  7   ----------------------------<  103<H>  3.7   |  25  |  0.42<L>    Ca    9.1      21 Feb 2018 06:45    VITALS  T(C): 36.6 (02-21-18 @ 06:47), Max: 36.9 (02-20-18 @ 15:13)  HR: 105 (02-21-18 @ 06:47) (91 - 105)  BP: 130/76 (02-21-18 @ 06:47) (106/73 - 130/84)  RR: 18 (02-21-18 @ 06:47) (16 - 18)  SpO2: 96% (02-21-18 @ 06:47) (96% - 99%)  Wt(kg): --    ALLERGIES  No Known Allergies    MEDICATIONS   acetaminophen   Tablet. 650 milliGRAM(s) Oral every 6 hours PRN  aspirin enteric coated 81 milliGRAM(s) Oral daily  atorvastatin 80 milliGRAM(s) Oral at bedtime  clopidogrel Tablet 75 milliGRAM(s) Oral daily  diazepam    Tablet 2 milliGRAM(s) Oral every 8 hours PRN  enoxaparin Injectable 40 milliGRAM(s) SubCutaneous every 24 hours  meclizine 12.5 milliGRAM(s) Oral every 8 hours PRN  ondansetron Injectable 4 milliGRAM(s) IV Push every 8 hours PRN    ----------------------------------------------------------------------------------------  PHYSICAL EXAM  Constitutional - NAD, Comfortable  HEENT - NCAT, EOMI  Neck - Supple, No limited ROM  Chest - CTA bilaterally, No wheeze, No rhonchi, No crackles  Cardiovascular - RRR, S1S2, No murmurs  Abdomen - BS+, Soft, NTND  Extremities - No C/C/E, No calf tenderness   Neurologic Exam -                    Cognitive - Awake, Alert, grossly intact thought, responses, processing speed     Communication - Fluent, No dysarthria, no aphasia     Motor - Grossly no focal deficits in proximal or distal limbs.                     Sensory - Intact to LT x 4 distal limbs  Psychiatric - Mood stable, Affect WNL

## 2018-02-21 NOTE — CONSULT NOTE ADULT - ASSESSMENT
S/p acute R corona radiata stroke.  On antiplatelet therapy.  Continues to have significant overall functional mobility and self-care deficits.  Would be expected to tolerate and benefit from short course of acute inpatient rehabilitation for intensive, aggressive multidisciplinary therapies before community reintegration.

## 2018-02-21 NOTE — PROGRESS NOTE ADULT - SUBJECTIVE AND OBJECTIVE BOX
Incomplete  S: The patient is     O:  Vital Signs Last 24 Hrs  T(C): 36.6 (21 Feb 2018 06:47), Max: 36.9 (20 Feb 2018 15:13)  T(F): 97.9 (21 Feb 2018 06:47), Max: 98.5 (20 Feb 2018 15:13)  HR: 105 (21 Feb 2018 06:47) (91 - 105)  BP: 130/76 (21 Feb 2018 06:47) (106/73 - 130/84)  BP(mean): --  RR: 18 (21 Feb 2018 06:47) (16 - 18)  SpO2: 96% (21 Feb 2018 06:47) (96% - 99%)    MEDICATIONS  (STANDING):  aspirin enteric coated 81 milliGRAM(s) Oral daily  atorvastatin 80 milliGRAM(s) Oral at bedtime  clopidogrel Tablet 75 milliGRAM(s) Oral daily  enoxaparin Injectable 40 milliGRAM(s) SubCutaneous every 24 hours    MEDICATIONS  (PRN):  acetaminophen   Tablet. 650 milliGRAM(s) Oral every 6 hours PRN mild, moderate pain  diazepam    Tablet 2 milliGRAM(s) Oral every 8 hours PRN Vertigo/Dizzines  meclizine 12.5 milliGRAM(s) Oral every 8 hours PRN Dizziness  ondansetron Injectable 4 milliGRAM(s) IV Push every 8 hours PRN Nausea and/or Vomiting                            14.9   6.79  )-----------( 260      ( 19 Feb 2018 06:00 )             43.4   02-19    139  |  100  |  8   ----------------------------<  115<H>  3.6   |  24  |  0.44<L>    Ca    9.2      19 Feb 2018 06:00    < from: MR Head No Cont (02.16.18 @ 17:19) >  IMPRESSION:      1. Minimal focus of diffusion hyperintensity in the right corona radiata   suggests small punctate focus of acute ischemia. Questionable second area   in the left frontal parasagittal region. Extensive white matter ischemic   changes involving the periventricular and subcortical white matter..    2. marked a of both the neck and [place] are technically limited   though at the intracranial level there does appear to be intracranial   vascular compromise with an stenosis in the right M1 greater than the   left M1 though present bilaterally as well as stenosis in the proximal   right A2 that all appear significant. Correlation with CTA is suggested   for confirmation as these vascular studies are technically limited    < end of copied text > Incomplete  S: The patient is     O:  Vital Signs Last 24 Hrs  T(C): 36.6 (21 Feb 2018 06:47), Max: 36.9 (20 Feb 2018 15:13)  T(F): 97.9 (21 Feb 2018 06:47), Max: 98.5 (20 Feb 2018 15:13)  HR: 105 (21 Feb 2018 06:47) (91 - 105)  BP: 130/76 (21 Feb 2018 06:47) (106/73 - 130/84)  BP(mean): --  RR: 18 (21 Feb 2018 06:47) (16 - 18)  SpO2: 96% (21 Feb 2018 06:47) (96% - 99%)    MEDICATIONS  (STANDING):  aspirin enteric coated 81 milliGRAM(s) Oral daily  atorvastatin 80 milliGRAM(s) Oral at bedtime  clopidogrel Tablet 75 milliGRAM(s) Oral daily  enoxaparin Injectable 40 milliGRAM(s) SubCutaneous every 24 hours    MEDICATIONS  (PRN):  acetaminophen   Tablet. 650 milliGRAM(s) Oral every 6 hours PRN mild, moderate pain  diazepam    Tablet 2 milliGRAM(s) Oral every 8 hours PRN Vertigo/Dizzines  meclizine 12.5 milliGRAM(s) Oral every 8 hours PRN Dizziness  ondansetron Injectable 4 milliGRAM(s) IV Push every 8 hours PRN Nausea and/or Vomiting                            14.9   6.79  )-----------( 260      ( 19 Feb 2018 06:00 )             43.4   02-19    139  |  100  |  8   ----------------------------<  115<H>  3.6   |  24  |  0.44<L>    Ca    9.2      19 Feb 2018 06:00    < from: MR Head No Cont (02.16.18 @ 17:19) >  IMPRESSION:      1. Minimal focus of diffusion hyperintensity in the right corona radiata   suggests small punctate focus of acute ischemia. Questionable second area   in the left frontal parasagittal region. Extensive white matter ischemic   changes involving the periventricular and subcortical white matter..    2. marked a of both the neck and [place] are technically limited   though at the intracranial level there does appear to be intracranial   vascular compromise with an stenosis in the right M1 greater than the   left M1 though present bilaterally as well as stenosis in the proximal   right A2 that all appear significant. Correlation with CTA is suggested   for confirmation as these vascular studies are technically limited    < end of copied text >    CONCLUSIONS:  Technically difficult study.  1. Mitral annular calcification, otherwise normal mitral  valve. Minimal mitral regurgitation.  2. Endocardium not well visualized; grossly normal left  ventricular systolic function.  Endocardial visualization  enhanced with intravenous injection of echo contrast  (Definity).  3. Unable to accurately evaluate right ventricular size or  systolic function.  No obvious cardiac source of embolus was identified on this  transthoracic study.  If clinical suspicion is high,  consider JORGE for further evaluation. S: The patient is visited at the bedside, she is feeling better    O:  Vital Signs Last 24 Hrs  T(C): 36.6 (21 Feb 2018 06:47), Max: 36.9 (20 Feb 2018 15:13)  T(F): 97.9 (21 Feb 2018 06:47), Max: 98.5 (20 Feb 2018 15:13)  HR: 105 (21 Feb 2018 06:47) (91 - 105)  BP: 130/76 (21 Feb 2018 06:47) (106/73 - 130/84)  BP(mean): --  RR: 18 (21 Feb 2018 06:47) (16 - 18)  SpO2: 96% (21 Feb 2018 06:47) (96% - 99%)    MEDICATIONS  (STANDING):  aspirin enteric coated 81 milliGRAM(s) Oral daily  atorvastatin 80 milliGRAM(s) Oral at bedtime  clopidogrel Tablet 75 milliGRAM(s) Oral daily  enoxaparin Injectable 40 milliGRAM(s) SubCutaneous every 24 hours    MEDICATIONS  (PRN):  acetaminophen   Tablet. 650 milliGRAM(s) Oral every 6 hours PRN mild, moderate pain  diazepam    Tablet 2 milliGRAM(s) Oral every 8 hours PRN Vertigo/Dizzines  meclizine 12.5 milliGRAM(s) Oral every 8 hours PRN Dizziness  ondansetron Injectable 4 milliGRAM(s) IV Push every 8 hours PRN Nausea and/or Vomiting    Neuro exam  A & O x 3  Cranial Nerves: Visual acuity intact bilaterally, visual fields full to confrontation, pupils equal round and reactive to light, extraocular motion intact, facial sensation intact symmetrically in V1-V3 bilaterally, face symmetrical, hearing was intact bilaterally, head turning and shoulder shrug symmetric and there was no tongue deviation with protrusion.  5/5 strength in all extremities, normal tone and bulk.  Deep tendon reflexes:   Biceps right 2+. Biceps left 2+  Patella right 2+. Patella left 2+.   intact to LT/PP/Vibr/Pos/Temp 4x.                          14.9   6.79  )-----------( 260      ( 19 Feb 2018 06:00 )             43.4   02-19    139  |  100  |  8   ----------------------------<  115<H>  3.6   |  24  |  0.44<L>    Ca    9.2      19 Feb 2018 06:00    < from: MR Head No Cont (02.16.18 @ 17:19) >  IMPRESSION:      1. Minimal focus of diffusion hyperintensity in the right corona radiata   suggests small punctate focus of acute ischemia. Questionable second area   in the left frontal parasagittal region. Extensive white matter ischemic   changes involving the periventricular and subcortical white matter..    2. marked a of both the neck and [place] are technically limited   though at the intracranial level there does appear to be intracranial   vascular compromise with an stenosis in the right M1 greater than the   left M1 though present bilaterally as well as stenosis in the proximal   right A2 that all appear significant. Correlation with CTA is suggested   for confirmation as these vascular studies are technically limited    < end of copied text >    CONCLUSIONS:  Technically difficult study.  1. Mitral annular calcification, otherwise normal mitral  valve. Minimal mitral regurgitation.  2. Endocardium not well visualized; grossly normal left  ventricular systolic function.  Endocardial visualization  enhanced with intravenous injection of echo contrast  (Definity).  3. Unable to accurately evaluate right ventricular size or  systolic function.  No obvious cardiac source of embolus was identified on this  transthoracic study.  If clinical suspicion is high,  consider JORGE for further evaluation. S: The patient is visited at the bedside, she is feeling better    O:  Vital Signs Last 24 Hrs  T(C): 36.6 (21 Feb 2018 06:47), Max: 36.9 (20 Feb 2018 15:13)  T(F): 97.9 (21 Feb 2018 06:47), Max: 98.5 (20 Feb 2018 15:13)  HR: 105 (21 Feb 2018 06:47) (91 - 105)  BP: 130/76 (21 Feb 2018 06:47) (106/73 - 130/84)  BP(mean): --  RR: 18 (21 Feb 2018 06:47) (16 - 18)  SpO2: 96% (21 Feb 2018 06:47) (96% - 99%)    MEDICATIONS  (STANDING):  aspirin enteric coated 81 milliGRAM(s) Oral dailyhe  atorvastatin 80 milliGRAM(s) Oral at bedtime  clopidogrel Tablet 75 milliGRAM(s) Oral daily  enoxaparin Injectable 40 milliGRAM(s) SubCutaneous every 24 hours    MEDICATIONS  (PRN):  acetaminophen   Tablet. 650 milliGRAM(s) Oral every 6 hours PRN mild, moderate pain  diazepam    Tablet 2 milliGRAM(s) Oral every 8 hours PRN Vertigo/Dizzines  meclizine 12.5 milliGRAM(s) Oral every 8 hours PRN Dizziness  ondansetron Injectable 4 milliGRAM(s) IV Push every 8 hours PRN Nausea and/or Vomiting    Neuro exam  A & O x 3  Cranial Nerves: Visual acuity intact bilaterally, visual fields full to confrontation, pupils equal round and reactive to light, extraocular motion intact, facial sensation intact symmetrically in V1-V3 bilaterally, face symmetrical, hearing was intact bilaterally, head turning and shoulder shrug symmetric and there was no tongue deviation with protrusion.  5/5 strength in all extremities, normal tone and bulk.  Deep tendon reflexes:   Biceps right 2+. Biceps left 2+  Patella right 2+. Patella left 2+.   intact to LT/PP/Vibr/Pos/Temp 4x.                          14.9   6.79  )-----------( 260      ( 19 Feb 2018 06:00 )             43.4   02-19    139  |  100  |  8   ----------------------------<  115<H>  3.6   |  24  |  0.44<L>    Ca    9.2      19 Feb 2018 06:00    < from: MR Head No Cont (02.16.18 @ 17:19) >  IMPRESSION:      1. Minimal focus of diffusion hyperintensity in the right corona radiata   suggests small punctate focus of acute ischemia. Questionable second area   in the left frontal parasagittal region. Extensive white matter ischemic   changes involving the periventricular and subcortical white matter..    2. marked a of both the neck and [place] are technically limited   though at the intracranial level there does appear to be intracranial   vascular compromise with an stenosis in the right M1 greater than the   left M1 though present bilaterally as well as stenosis in the proximal   right A2 that all appear significant. Correlation with CTA is suggested   for confirmation as these vascular studies are technically limited    < end of copied text >    CONCLUSIONS:  Technically difficult study.  1. Mitral annular calcification, otherwise normal mitral  valve. Minimal mitral regurgitation.  2. Endocardium not well visualized; grossly normal left  ventricular systolic function.  Endocardial visualization  enhanced with intravenous injection of echo contrast  (Definity).  3. Unable to accurately evaluate right ventricular size or  systolic function.  No obvious cardiac source of embolus was identified on this  transthoracic study.  If clinical suspicion is high,  consider JORGE for further evaluation.

## 2018-02-21 NOTE — PROGRESS NOTE ADULT - ASSESSMENT
Incomplete  72 RHD y/o woman pmh recently diagnosed HTN and recently diagnosed CVA unknown location she p/w 'room spinning' sensation that began upon awakening this am. Dizziness a/w HA and nausea.  CRUZ described a throbbing posterior HA. She was recently diagnosed with a stroke on MRI but unable to state cva's location.  MRI and MRA show a a punctate R corona radiata, acute to subacute, and bilateral M1 and R A1 atenosis  Impression: R [place] radiata subacute to chronic stroke, irrelevant to the patient's symptoms + Intracranial large artery stenosis  Plan:  [] C/W ASA 81 daily  [] Start and c/w plavix for 3 m per [facility], if there are no other contraindications  [] Normotensive as of today  [] TTE with bubble study +/- Loop as an in/outpatient  [] A1c and LDL level  [] statin (goal LDL <100)  [] PT/OT  [] patient can be discharged from neurovascular stand point Incomplete  72 RHD y/o woman pmh recently diagnosed HTN and recently diagnosed CVA unknown location she p/w 'room spinning' sensation that began upon awakening this am. Dizziness a/w HA and nausea.  CRUZ described a throbbing posterior HA. She was recently diagnosed with a stroke on MRI but unable to state cva's location. Exam is neurologically intact.  MRI and MRA show a a punctate R corona radiata, acute to subacute, and bilateral M1 and R A1 atenosis  Impression: R [place] radiata subacute to chronic stroke, irrelevant to the patient's symptoms + Intracranial large artery stenosis  Plan:  [] C/W ASA 81 daily  [] [facility] if there are no other [facility]ind[facility]  [] TTE with bubble study +/- Loop as an outpatient  [] Aggressive risk factor management  [] A1c and LDL level  [] statin (goal LDL <100)  [] PT/OT  [] patient can be discharged from neurovascular stand point 72 RHD y/o woman pmh recently diagnosed HTN and recently diagnosed CVA unknown location she p/w 'room spinning' sensation that began upon awakening this am. Dizziness a/w HA and nausea.  CRUZ described a throbbing posterior HA. She was recently diagnosed with a stroke on MRI but unable to state cva's location. Exam is neurologically intact.  MRI and MRA show a a punctate R corona radiata, acute to subacute, and bilateral M1 and R A1 atenosis  Impression: R [place] radiata subacute to chronic stroke, irrelevant to the patient's symptoms + Intracranial large artery stenosis; Etiology intracerebral large vessel disease  Plan:  [] C/W ASA 81 daily  [] Start and c/w plavix for 3 m per [facility], if there are no other contraindications  [] Aggressive risk factor management  [] A1c and LDL level  [] statin (goal LDL <100)  [] PT/OT  [] patient can be discharged from neurovascular stand point 72 RHD y/o woman pmh recently diagnosed HTN and recently diagnosed CVA unknown location she p/w 'room spinning' sensation that began upon awakening this am. Dizziness a/w HA and nausea.  CRUZ described a throbbing posterior HA. She was recently diagnosed with a stroke on MRI but unable to state cva's location. Exam is neurologically intact.  MRI and MRA show a a punctate R corona radiata, acute to subacute, and bilateral M1 and R A1 atenosis  Impression: R [place] radiata subacute to chronic stroke, difficult to correlate with the patient's symptoms + Intracranial large artery stenosis; Etiology intracranial large artery atherosclerosis.  Plan:  [] C/W ASA 81 daily  [] Start and c/w plavix for 3 months per KD, if there are no other contraindications  [] Aggressive risk factor management  [] A1c and LDL level  [] statin (for example [id number] mg daily) (goal LDL <100)  [] PT/OT  [] patient can be discharged from neurovascular stand point

## 2018-02-22 LAB
BUN SERPL-MCNC: 11 MG/DL — SIGNIFICANT CHANGE UP (ref 7–23)
CALCIUM SERPL-MCNC: 9.3 MG/DL — SIGNIFICANT CHANGE UP (ref 8.4–10.5)
CHLORIDE SERPL-SCNC: 100 MMOL/L — SIGNIFICANT CHANGE UP (ref 98–107)
CO2 SERPL-SCNC: 24 MMOL/L — SIGNIFICANT CHANGE UP (ref 22–31)
CREAT SERPL-MCNC: 0.5 MG/DL — SIGNIFICANT CHANGE UP (ref 0.5–1.3)
GLUCOSE SERPL-MCNC: 100 MG/DL — HIGH (ref 70–99)
HCT VFR BLD CALC: 41.6 % — SIGNIFICANT CHANGE UP (ref 34.5–45)
HGB BLD-MCNC: 14.7 G/DL — SIGNIFICANT CHANGE UP (ref 11.5–15.5)
MCHC RBC-ENTMCNC: 33.3 PG — SIGNIFICANT CHANGE UP (ref 27–34)
MCHC RBC-ENTMCNC: 35.3 % — SIGNIFICANT CHANGE UP (ref 32–36)
MCV RBC AUTO: 94.1 FL — SIGNIFICANT CHANGE UP (ref 80–100)
NRBC # FLD: 0 — SIGNIFICANT CHANGE UP
PLATELET # BLD AUTO: 245 K/UL — SIGNIFICANT CHANGE UP (ref 150–400)
PMV BLD: 9.1 FL — SIGNIFICANT CHANGE UP (ref 7–13)
POTASSIUM SERPL-MCNC: 3.6 MMOL/L — SIGNIFICANT CHANGE UP (ref 3.5–5.3)
POTASSIUM SERPL-SCNC: 3.6 MMOL/L — SIGNIFICANT CHANGE UP (ref 3.5–5.3)
RBC # BLD: 4.42 M/UL — SIGNIFICANT CHANGE UP (ref 3.8–5.2)
RBC # FLD: 12.4 % — SIGNIFICANT CHANGE UP (ref 10.3–14.5)
SODIUM SERPL-SCNC: 140 MMOL/L — SIGNIFICANT CHANGE UP (ref 135–145)
WBC # BLD: 7.59 K/UL — SIGNIFICANT CHANGE UP (ref 3.8–10.5)
WBC # FLD AUTO: 7.59 K/UL — SIGNIFICANT CHANGE UP (ref 3.8–10.5)

## 2018-02-22 PROCEDURE — 99232 SBSQ HOSP IP/OBS MODERATE 35: CPT

## 2018-02-22 RX ADMIN — CLOPIDOGREL BISULFATE 75 MILLIGRAM(S): 75 TABLET, FILM COATED ORAL at 12:09

## 2018-02-22 RX ADMIN — Medication 81 MILLIGRAM(S): at 12:09

## 2018-02-22 RX ADMIN — ATORVASTATIN CALCIUM 80 MILLIGRAM(S): 80 TABLET, FILM COATED ORAL at 21:27

## 2018-02-22 RX ADMIN — ENOXAPARIN SODIUM 40 MILLIGRAM(S): 100 INJECTION SUBCUTANEOUS at 21:27

## 2018-02-22 NOTE — PROGRESS NOTE ADULT - SUBJECTIVE AND OBJECTIVE BOX
Patient is a 72y old  Female who presents with a chief complaint of dizziness and headache x 1 day (20 Feb 2018 09:45)      SUBJECTIVE / OVERNIGHT EVENTS: patient seen and examined by bedside at       MEDICATIONS  (STANDING):  aspirin enteric coated 81 milliGRAM(s) Oral daily  atorvastatin 80 milliGRAM(s) Oral at bedtime  clopidogrel Tablet 75 milliGRAM(s) Oral daily  enoxaparin Injectable 40 milliGRAM(s) SubCutaneous every 24 hours    MEDICATIONS  (PRN):  acetaminophen   Tablet. 650 milliGRAM(s) Oral every 6 hours PRN mild, moderate pain  diazepam    Tablet 2 milliGRAM(s) Oral every 8 hours PRN Vertigo/Dizzines  meclizine 12.5 milliGRAM(s) Oral every 8 hours PRN Dizziness  ondansetron Injectable 4 milliGRAM(s) IV Push every 8 hours PRN Nausea and/or Vomiting      Vital Signs Last 24 Hrs  T(C): 36.7 (22 Feb 2018 05:57), Max: 37.3 (21 Feb 2018 13:02)  T(F): 98 (22 Feb 2018 05:57), Max: 99.2 (21 Feb 2018 13:02)  HR: 99 (22 Feb 2018 05:57) (93 - 99)  BP: 144/88 (22 Feb 2018 05:57) (102/69 - 144/88)  BP(mean): --  RR: 18 (22 Feb 2018 05:57) (18 - 18)  SpO2: 99% (22 Feb 2018 05:57) (96% - 99%)  CAPILLARY BLOOD GLUCOSE        I&O's Summary      PHYSICAL EXAM:  GENERAL: NAD, well-developed  HEAD:  Atraumatic, Normocephalic  EYES: EOMI, PERRLA, conjunctiva and sclera clear  NECK: Supple, No JVD  CHEST/LUNG: Clear to auscultation bilaterally; No wheeze  HEART: Regular rate and rhythm; No murmurs, rubs, or gallops  ABDOMEN: Soft, Nontender, Nondistended; Bowel sounds present  EXTREMITIES:  2+ Peripheral Pulses, No clubbing, cyanosis, or edema  PSYCH: AAOx3  NEUROLOGY: non-focal  SKIN: No rashes or lesions    LABS:                        14.7   7.59  )-----------( 245      ( 22 Feb 2018 06:00 )             41.6     02-22    140  |  100  |  11  ----------------------------<  100<H>  3.6   |  24  |  0.50    Ca    9.3      22 Feb 2018 06:00                RADIOLOGY & ADDITIONAL TESTS:    Imaging Personally Reviewed:    Consultant(s) Notes Reviewed:      Care Discussed with Consultants/Other Providers: Patient is a 72y old  Female who presents with a chief complaint of dizziness and headache x 1 day (20 Feb 2018 09:45)      SUBJECTIVE / OVERNIGHT EVENTS: patient seen and examined by bedside at 10:40 Am, no acute distress noted  , no  new complain       MEDICATIONS  (STANDING):  aspirin enteric coated 81 milliGRAM(s) Oral daily  atorvastatin 80 milliGRAM(s) Oral at bedtime  clopidogrel Tablet 75 milliGRAM(s) Oral daily  enoxaparin Injectable 40 milliGRAM(s) SubCutaneous every 24 hours    MEDICATIONS  (PRN):  acetaminophen   Tablet. 650 milliGRAM(s) Oral every 6 hours PRN mild, moderate pain  diazepam    Tablet 2 milliGRAM(s) Oral every 8 hours PRN Vertigo/Dizzines  meclizine 12.5 milliGRAM(s) Oral every 8 hours PRN Dizziness  ondansetron Injectable 4 milliGRAM(s) IV Push every 8 hours PRN Nausea and/or Vomiting      Vital Signs Last 24 Hrs  T(C): 36.7 (22 Feb 2018 05:57), Max: 37.3 (21 Feb 2018 13:02)  T(F): 98 (22 Feb 2018 05:57), Max: 99.2 (21 Feb 2018 13:02)  HR: 99 (22 Feb 2018 05:57) (93 - 99)  BP: 144/88 (22 Feb 2018 05:57) (102/69 - 144/88)  BP(mean): --  RR: 18 (22 Feb 2018 05:57) (18 - 18)  SpO2: 99% (22 Feb 2018 05:57) (96% - 99%)  CAPILLARY BLOOD GLUCOSE        I&O's Summary      PHYSICAL EXAM:  GENERAL: NAD, well-developed  HEAD:  Atraumatic, Normocephalic  EYES: EOMI, PERRLA, conjunctiva and sclera clear  NECK: Supple,   CHEST/LUNG: Clear to auscultation bilaterally; No wheeze  HEART: Regular rate and rhythm;  ABDOMEN: Soft, Nontender, Nondistended; Bowel sounds present  EXTREMITIES:  2+ Peripheral Pulses, No clubbing, cyanosis, or edema  PSYCH: AAOx3  NEUROLOGY: non-focal  SKIN: No rashes or lesions    LABS:                        14.7   7.59  )-----------( 245      ( 22 Feb 2018 06:00 )             41.6     02-22    140  |  100  |  11  ----------------------------<  100<H>  3.6   |  24  |  0.50    Ca    9.3      22 Feb 2018 06:00                RADIOLOGY & ADDITIONAL TESTS:    Imaging Personally Reviewed:    Consultant(s) Notes Reviewed:      Care Discussed with Consultants/Other Providers:

## 2018-02-23 VITALS
DIASTOLIC BLOOD PRESSURE: 66 MMHG | RESPIRATION RATE: 18 BRPM | HEART RATE: 103 BPM | SYSTOLIC BLOOD PRESSURE: 117 MMHG | TEMPERATURE: 98 F | OXYGEN SATURATION: 100 %

## 2018-02-23 PROBLEM — I63.9 CEREBRAL INFARCTION, UNSPECIFIED: Chronic | Status: ACTIVE | Noted: 2018-02-16

## 2018-02-23 LAB
BUN SERPL-MCNC: 9 MG/DL — SIGNIFICANT CHANGE UP (ref 7–23)
CALCIUM SERPL-MCNC: 9.1 MG/DL — SIGNIFICANT CHANGE UP (ref 8.4–10.5)
CHLORIDE SERPL-SCNC: 102 MMOL/L — SIGNIFICANT CHANGE UP (ref 98–107)
CO2 SERPL-SCNC: 26 MMOL/L — SIGNIFICANT CHANGE UP (ref 22–31)
CREAT SERPL-MCNC: 0.49 MG/DL — LOW (ref 0.5–1.3)
GLUCOSE SERPL-MCNC: 109 MG/DL — HIGH (ref 70–99)
HCT VFR BLD CALC: 41.1 % — SIGNIFICANT CHANGE UP (ref 34.5–45)
HGB BLD-MCNC: 14.7 G/DL — SIGNIFICANT CHANGE UP (ref 11.5–15.5)
MCHC RBC-ENTMCNC: 33.5 PG — SIGNIFICANT CHANGE UP (ref 27–34)
MCHC RBC-ENTMCNC: 35.8 % — SIGNIFICANT CHANGE UP (ref 32–36)
MCV RBC AUTO: 93.6 FL — SIGNIFICANT CHANGE UP (ref 80–100)
NRBC # FLD: 0 — SIGNIFICANT CHANGE UP
PLATELET # BLD AUTO: 238 K/UL — SIGNIFICANT CHANGE UP (ref 150–400)
PMV BLD: 9.1 FL — SIGNIFICANT CHANGE UP (ref 7–13)
POTASSIUM SERPL-MCNC: 4.1 MMOL/L — SIGNIFICANT CHANGE UP (ref 3.5–5.3)
POTASSIUM SERPL-SCNC: 4.1 MMOL/L — SIGNIFICANT CHANGE UP (ref 3.5–5.3)
RBC # BLD: 4.39 M/UL — SIGNIFICANT CHANGE UP (ref 3.8–5.2)
RBC # FLD: 12.5 % — SIGNIFICANT CHANGE UP (ref 10.3–14.5)
SODIUM SERPL-SCNC: 141 MMOL/L — SIGNIFICANT CHANGE UP (ref 135–145)
WBC # BLD: 7.68 K/UL — SIGNIFICANT CHANGE UP (ref 3.8–10.5)
WBC # FLD AUTO: 7.68 K/UL — SIGNIFICANT CHANGE UP (ref 3.8–10.5)

## 2018-02-23 PROCEDURE — 99239 HOSP IP/OBS DSCHRG MGMT >30: CPT

## 2018-02-23 RX ORDER — FAMOTIDINE 10 MG/ML
0 INJECTION INTRAVENOUS
Qty: 0 | Refills: 0 | COMMUNITY

## 2018-02-23 RX ORDER — METOPROLOL TARTRATE 50 MG
1 TABLET ORAL
Qty: 0 | Refills: 0 | COMMUNITY

## 2018-02-23 RX ORDER — ASPIRIN/CALCIUM CARB/MAGNESIUM 324 MG
1 TABLET ORAL
Qty: 0 | Refills: 0 | COMMUNITY

## 2018-02-23 RX ORDER — ATORVASTATIN CALCIUM 80 MG/1
1 TABLET, FILM COATED ORAL
Qty: 30 | Refills: 0 | OUTPATIENT
Start: 2018-02-23 | End: 2018-03-24

## 2018-02-23 RX ORDER — CLOPIDOGREL BISULFATE 75 MG/1
1 TABLET, FILM COATED ORAL
Qty: 30 | Refills: 0 | OUTPATIENT
Start: 2018-02-23 | End: 2018-03-24

## 2018-02-23 RX ORDER — ERGOCALCIFEROL 1.25 MG/1
0 CAPSULE ORAL
Qty: 0 | Refills: 0 | COMMUNITY

## 2018-02-23 RX ORDER — ASPIRIN/CALCIUM CARB/MAGNESIUM 324 MG
1 TABLET ORAL
Qty: 0 | Refills: 0 | COMMUNITY
Start: 2018-02-23

## 2018-02-23 RX ORDER — MECLIZINE HCL 12.5 MG
1 TABLET ORAL
Qty: 42 | Refills: 0 | OUTPATIENT
Start: 2018-02-23 | End: 2018-03-08

## 2018-02-23 RX ADMIN — CLOPIDOGREL BISULFATE 75 MILLIGRAM(S): 75 TABLET, FILM COATED ORAL at 11:03

## 2018-02-23 RX ADMIN — Medication 81 MILLIGRAM(S): at 11:03

## 2018-02-23 NOTE — PROGRESS NOTE ADULT - PROBLEM SELECTOR PROBLEM 2
Vertigo following cerebrovascular accident (CVA)

## 2018-02-23 NOTE — PROGRESS NOTE ADULT - PROBLEM SELECTOR PLAN 1
Acute right corona radiata CVA per MRI report above. Awaiting further neuro recs. Permissive HTN no longer needed since > 24 hours. No overnight tele events.   - Continue aspirin 81 mg  - Awaiting TTE with bubble study  - PT recommending MIKE  - MRI shows intracranial vascular compromise; will discuss with neuro   - C/w statin
Acute right corona radiata CVA per MRI report above. Neuro recs appreciated. Permissive HTN no longer needed since > 24 hours. No tele events overnight.  - Continue aspirin 81 mg  - Awaiting TTE with bubble study  - PT recommending MIKE  - MRI shows intracranial vascular compromise; per discussion with neuro, this is not likely the etiology of her strokes given distribution. Strokes more likely due to cardioembolic event.   - C/w statin  - A1C 6  - Would call EP consult tomorrow for ILR appropriateness pending TTE results and likelihood of cardioembolic events
Acute right corona radiata CVA per MRI report above. Neuro recs appreciated. Permissive HTN no longer needed since > 24 hours. No tele events overnight.  - Continue aspirin 81 mg  -Neurology f/u noted, recommend adding Plavix if no contraindication   - Awaiting TTE with bubble study  - PT recommending MIKE  - MRI shows intracranial vascular compromise; per discussion with neuro, this is not likely the etiology of her strokes given distribution. Strokes more likely due to cardioembolic event.   - C/w statin  - A1C 6  - Plan for ILR depending on  TTE
Acute right corona radiata CVA per MRI report above. Neuro recs appreciated. Permissive HTN no longer needed since > 24 hours. No tele events overnight.  - Continue aspirin 81 mg  -Neurology f/u noted, recommend adding Plavix if no contraindication   - TTE noted, case d/w neuro, no need for JORGE/ILR   - PT recommending acute rehab   - MRI shows intracranial vascular compromise; per discussion with neuro, this is not likely the etiology of her strokes given distribution. Strokes more likely due to cardioembolic event.   - C/w statin  - A1C 6
Acute right corona radiata CVA per MRI report above. Neuro recs appreciated. Permissive HTN no longer needed since > 24 hours. No tele events overnight.  - Continue aspirin 81 mg  -Neurology f/u noted, recommend adding Plavix if no contraindication   - TTE noted, case d/w neuro, no need for JORGE/ILR   - PT recommending acute rehab   - MRI shows intracranial vascular compromise; per discussion with neuro, this is not likely the etiology of her strokes given distribution. Strokes more likely due to cardioembolic event.   - C/w statin  - A1C 6
Acute right corona radiata CVA per MRI report above. Neuro recs appreciated. Permissive HTN no longer needed since > 24 hours. Overnight bradycardic to 44bpm but asymptomatic  - Continue aspirin 81 mg  - Awaiting TTE with bubble study  - PT recommending MIKE  - MRI shows intracranial vascular compromise; per discussion with neuro, this is not likely the etiology of her strokes given distribution. Strokes more likely due to cardioembolic event.   - C/w statin
Acute right corona radiata CVA per MRI report above. Neuro recs appreciated. Permissive HTN no longer needed since > 24 hours. No tele events overnight.  - Continue aspirin 81 mg  -Neurology f/u noted, recommend adding Plavix if no contraindication   - TTE noted, case d/w neuro, no need for JORGE/ILR   - PT recommending acute rehab   - MRI shows intracranial vascular compromise; per discussion with neuro, this is not likely the etiology of her strokes given distribution. Strokes more likely due to cardioembolic event.   - C/w statin  - A1C 6

## 2018-02-23 NOTE — PROGRESS NOTE ADULT - ATTENDING COMMENTS
Dc planning to acute rehab  pt medically stable for discharge Pt did not get approved for Acute rehab  peer to peer done , spoke to Dr Guardado, pt does not qualify for acute rehab as does not need 3/week MD visit , but approved for [facility] planning to subacute rehab  pt medically stable for discharge

## 2018-02-23 NOTE — PROGRESS NOTE ADULT - PROBLEM SELECTOR PLAN 4
Tylenol prn for pain

## 2018-02-23 NOTE — PROGRESS NOTE ADULT - PROBLEM SELECTOR PROBLEM 5
Urinary tract infection

## 2018-02-23 NOTE — PROGRESS NOTE ADULT - PROBLEM SELECTOR PLAN 5
Likely asymptomatic bacteriuria given lack of reported symptoms  - Monitor for symptoms for now

## 2018-02-23 NOTE — PROGRESS NOTE ADULT - PROBLEM SELECTOR PROBLEM 1
CVA (cerebral vascular accident)

## 2018-02-23 NOTE — PROGRESS NOTE ADULT - PROBLEM/PLAN-6
VACCINE ADMINISTRATION RECORD  PARENT / GUARDIAN APPROVAL  Date: 2018  Vaccine administered to: Floyd Brock     : 2018    MRN: [id number]    A copy of the appropriate Centers for Disease Control and Prevention Vaccine Information statement has
DISPLAY PLAN FREE TEXT

## 2018-02-23 NOTE — PROGRESS NOTE ADULT - PROBLEM SELECTOR PLAN 6
DVT ppx- Lovenox

## 2018-02-23 NOTE — PROGRESS NOTE ADULT - SUBJECTIVE AND OBJECTIVE BOX
Patient is a 72y old  Female who presents with a chief complaint of dizziness and headache x 1 day (20 Feb 2018 09:45)      SUBJECTIVE / OVERNIGHT EVENTS:    MEDICATIONS  (STANDING):  aspirin enteric coated 81 milliGRAM(s) Oral daily  atorvastatin 80 milliGRAM(s) Oral at bedtime  clopidogrel Tablet 75 milliGRAM(s) Oral daily  enoxaparin Injectable 40 milliGRAM(s) SubCutaneous every 24 hours    MEDICATIONS  (PRN):  acetaminophen   Tablet. 650 milliGRAM(s) Oral every 6 hours PRN mild, moderate pain  diazepam    Tablet 2 milliGRAM(s) Oral every 8 hours PRN Vertigo/Dizzines  meclizine 12.5 milliGRAM(s) Oral every 8 hours PRN Dizziness  ondansetron Injectable 4 milliGRAM(s) IV Push every 8 hours PRN Nausea and/or Vomiting      Vital Signs Last 24 Hrs  T(C): 36.4 (23 Feb 2018 05:14), Max: 37.1 (22 Feb 2018 21:25)  T(F): 97.6 (23 Feb 2018 05:14), Max: 98.7 (22 Feb 2018 21:25)  HR: 95 (23 Feb 2018 05:14) (95 - 102)  BP: 127/78 (23 Feb 2018 05:14) (115/79 - 133/78)  BP(mean): --  RR: 16 (23 Feb 2018 05:14) (16 - 19)  SpO2: 97% (23 Feb 2018 05:14) (96% - 98%)  CAPILLARY BLOOD GLUCOSE        I&O's Summary      PHYSICAL EXAM:  GENERAL: NAD, well-developed  HEAD:  Atraumatic, Normocephalic  EYES: EOMI, PERRLA, conjunctiva and sclera clear  NECK: Supple,   CHEST/LUNG: Clear to auscultation bilaterally; No wheeze  HEART: Regular rate and rhythm;  ABDOMEN: Soft, Nontender, Nondistended; Bowel sounds present  EXTREMITIES:  2+ Peripheral Pulses, No clubbing, cyanosis, or edema  PSYCH: AAOx3  NEUROLOGY: non-focal  SKIN: No rashes or lesions      LABS:                        14.7   7.68  )-----------( 238      ( 23 Feb 2018 07:25 )             41.1     02-23    141  |  102  |  9   ----------------------------<  109<H>  4.1   |  26  |  0.49<L>    Ca    9.1      23 Feb 2018 07:25                RADIOLOGY & ADDITIONAL TESTS:    Imaging Personally Reviewed:    Consultant(s) Notes Reviewed:      Care Discussed with Consultants/Other Providers: Patient is a 72y old  Female who presents with a chief complaint of dizziness and headache x 1 day (20 Feb 2018 09:45)      SUBJECTIVE / OVERNIGHT EVENTS: patient seen and examined by bedside, no acute distress noted  , no acute events over night        MEDICATIONS  (STANDING):  aspirin enteric coated 81 milliGRAM(s) Oral daily  atorvastatin 80 milliGRAM(s) Oral at bedtime  clopidogrel Tablet 75 milliGRAM(s) Oral daily  enoxaparin Injectable 40 milliGRAM(s) SubCutaneous every 24 hours    MEDICATIONS  (PRN):  acetaminophen   Tablet. 650 milliGRAM(s) Oral every 6 hours PRN mild, moderate pain  diazepam    Tablet 2 milliGRAM(s) Oral every 8 hours PRN Vertigo/Dizzines  meclizine 12.5 milliGRAM(s) Oral every 8 hours PRN Dizziness  ondansetron Injectable 4 milliGRAM(s) IV Push every 8 hours PRN Nausea and/or Vomiting      Vital Signs Last 24 Hrs  T(C): 36.4 (23 Feb 2018 05:14), Max: 37.1 (22 Feb 2018 21:25)  T(F): 97.6 (23 Feb 2018 05:14), Max: 98.7 (22 Feb 2018 21:25)  HR: 95 (23 Feb 2018 05:14) (95 - 102)  BP: 127/78 (23 Feb 2018 05:14) (115/79 - 133/78)  BP(mean): --  RR: 16 (23 Feb 2018 05:14) (16 - 19)  SpO2: 97% (23 Feb 2018 05:14) (96% - 98%)  CAPILLARY BLOOD GLUCOSE        I&O's Summary      PHYSICAL EXAM:  GENERAL: NAD, well-developed  HEAD:  Atraumatic, Normocephalic  EYES: EOMI, PERRLA, conjunctiva and sclera clear  NECK: Supple,   CHEST/LUNG: Clear to auscultation bilaterally; No wheeze  HEART: Regular rate and rhythm;  ABDOMEN: Soft, Nontender, Nondistended; Bowel sounds present  EXTREMITIES:  2+ Peripheral Pulses, No clubbing, cyanosis, or edema  PSYCH: AAOx3  NEUROLOGY: non-focal  SKIN: No rashes or lesions      LABS:                        14.7   7.68  )-----------( 238      ( 23 Feb 2018 07:25 )             41.1     02-23    141  |  102  |  9   ----------------------------<  109<H>  4.1   |  26  |  0.49<L>    Ca    9.1      23 Feb 2018 07:25                RADIOLOGY & ADDITIONAL TESTS:    Imaging Personally Reviewed:    Consultant(s) Notes Reviewed:      Care Discussed with Consultants/Other Providers:

## 2018-02-23 NOTE — PROGRESS NOTE ADULT - PROBLEM SELECTOR PLAN 3
BP acceptable off of metoprolol  - Hold for now   - Monitor VS's

## 2018-02-23 NOTE — PROGRESS NOTE ADULT - PROVIDER SPECIALTY LIST ADULT
Hospitalist
Neurology
Neurology
Hospitalist
Hospitalist

## 2018-02-23 NOTE — PROGRESS NOTE ADULT - PROBLEM SELECTOR PLAN 2
Suspect central due to recurrent CVA's vs. alternative etiology. Resolved since admission  - Counselled patient to ask for Meclizine if symptomatic, currently denies vertigo   - Tx for stroke as per above
Suspect central due to recurrent CVA's vs. alternative etiology. Resolved since admission  - Counselled patient to ask for Meclizine if symptomatic  - Tx for stroke as per above
Suspect central due to recurrent CVA's vs. alternative etiology. Resolved since admission  - Counselled patient to ask for Meclizine if symptomatic, currently denies vertigo   - Tx for stroke as per above
Suspect central due to recurrent CVA's vs. alternative etiology. Resolved since admission  - Counselled patient to ask for Meclizine if symptomatic, currently denies vertigo   - Tx for stroke as per above
Suspect central due to recurrent CVA's. Improved since admission  - Counselled patient to ask for Meclizine if symptomatic  - Tx for stroke as per above
Suspect central due to recurrent CVA's vs. alternative etiology. Resolved since admission  - Counselled patient to ask for Meclizine if symptomatic, currently denies vertigo   - Tx for stroke as per above
Suspect central due to recurrent CVA's vs. alternative etiology. Improved since admission  - Counselled patient to ask for Meclizine if symptomatic  - Tx for stroke as per above

## 2018-04-11 ENCOUNTER — MESSAGE (OUTPATIENT)
Age: 73
End: 2018-04-11

## 2018-05-07 ENCOUNTER — APPOINTMENT (OUTPATIENT)
Dept: SURGICAL ONCOLOGY | Facility: CLINIC | Age: 73
End: 2018-05-07
Payer: MEDICARE

## 2018-05-07 VITALS
OXYGEN SATURATION: 98 % | RESPIRATION RATE: 16 BRPM | DIASTOLIC BLOOD PRESSURE: 74 MMHG | HEIGHT: 61 IN | WEIGHT: 170 LBS | SYSTOLIC BLOOD PRESSURE: 128 MMHG | HEART RATE: 71 BPM | BODY MASS INDEX: 32.1 KG/M2

## 2018-05-07 PROCEDURE — 99214 OFFICE O/P EST MOD 30 MIN: CPT

## 2018-05-07 RX ORDER — ATORVASTATIN CALCIUM 40 MG/1
40 TABLET, FILM COATED ORAL
Refills: 0 | Status: ACTIVE | COMMUNITY

## 2018-05-07 RX ORDER — CLOPIDOGREL 75 MG/1
75 TABLET, FILM COATED ORAL
Refills: 0 | Status: ACTIVE | COMMUNITY

## 2018-05-18 ENCOUNTER — OUTPATIENT (OUTPATIENT)
Dept: OUTPATIENT SERVICES | Facility: HOSPITAL | Age: 73
LOS: 1 days | Discharge: HOME | End: 2018-05-18

## 2018-05-18 DIAGNOSIS — Z98.89 OTHER SPECIFIED POSTPROCEDURAL STATES: Chronic | ICD-10-CM

## 2018-05-18 DIAGNOSIS — C50.919 MALIGNANT NEOPLASM OF UNSPECIFIED SITE OF UNSPECIFIED FEMALE BREAST: ICD-10-CM

## 2018-05-22 ENCOUNTER — APPOINTMENT (OUTPATIENT)
Dept: HEMATOLOGY ONCOLOGY | Facility: CLINIC | Age: 73
End: 2018-05-22
Payer: MEDICARE

## 2018-05-22 ENCOUNTER — RESULT REVIEW (OUTPATIENT)
Age: 73
End: 2018-05-22

## 2018-05-22 VITALS
TEMPERATURE: 98 F | RESPIRATION RATE: 16 BRPM | WEIGHT: 174.17 LBS | DIASTOLIC BLOOD PRESSURE: 76 MMHG | HEART RATE: 77 BPM | BODY MASS INDEX: 32.91 KG/M2 | SYSTOLIC BLOOD PRESSURE: 124 MMHG | OXYGEN SATURATION: 99 %

## 2018-05-22 DIAGNOSIS — Z86.73 PERSONAL HISTORY OF TRANSIENT ISCHEMIC ATTACK (TIA), AND CEREBRAL INFARCTION W/OUT RESIDUAL DEFICITS: ICD-10-CM

## 2018-05-22 DIAGNOSIS — I10 ESSENTIAL (PRIMARY) HYPERTENSION: ICD-10-CM

## 2018-05-22 LAB
HCT VFR BLD CALC: 39.8 % — SIGNIFICANT CHANGE UP (ref 34.5–45)
HGB BLD-MCNC: 14.3 G/DL — SIGNIFICANT CHANGE UP (ref 11.5–15.5)
MCHC RBC-ENTMCNC: 33.8 PG — SIGNIFICANT CHANGE UP (ref 27–34)
MCHC RBC-ENTMCNC: 36 G/DL — SIGNIFICANT CHANGE UP (ref 32–36)
MCV RBC AUTO: 93.9 FL — SIGNIFICANT CHANGE UP (ref 80–100)
PLATELET # BLD AUTO: 280 K/UL — SIGNIFICANT CHANGE UP (ref 150–400)
RBC # BLD: 4.24 M/UL — SIGNIFICANT CHANGE UP (ref 3.8–5.2)
RBC # FLD: 11.9 % — SIGNIFICANT CHANGE UP (ref 10.3–14.5)
WBC # BLD: 7.6 K/UL — SIGNIFICANT CHANGE UP (ref 3.8–10.5)
WBC # FLD AUTO: 7.6 K/UL — SIGNIFICANT CHANGE UP (ref 3.8–10.5)

## 2018-05-22 PROCEDURE — 99215 OFFICE O/P EST HI 40 MIN: CPT

## 2018-05-22 RX ORDER — ASPIRIN ENTERIC COATED TABLETS 81 MG 81 MG/1
81 TABLET, DELAYED RELEASE ORAL
Refills: 0 | Status: ACTIVE | COMMUNITY

## 2018-06-29 NOTE — OCCUPATIONAL THERAPY INITIAL EVALUATION ADULT - FINE MOTOR COORDINATION, RIGHT HAND THUMB/FINGER OPPOSITION SKILLS, OT EVAL
"3/28/2018 with prescribing provider:  3/28/18 Yasimne   05/03/18 121/61   04/12/18 108/58   04/05/BYOO269 = WBC, [id number] = RBC         Passed - No active pregnancy 
Routing refill request to provider for review/approval because:  Labs not current:        
normal performance

## 2018-11-06 PROBLEM — M19.90 UNSPECIFIED OSTEOARTHRITIS, UNSPECIFIED SITE: Chronic | Status: ACTIVE | Noted: 2017-05-05

## 2018-11-06 PROBLEM — C50.919 MALIGNANT NEOPLASM OF UNSPECIFIED SITE OF UNSPECIFIED FEMALE BREAST: Chronic | Status: ACTIVE | Noted: 2017-05-05

## 2018-11-08 ENCOUNTER — OUTPATIENT (OUTPATIENT)
Dept: OUTPATIENT SERVICES | Facility: HOSPITAL | Age: 73
LOS: 1 days | Discharge: HOME | End: 2018-11-08

## 2018-11-08 DIAGNOSIS — Z98.89 OTHER SPECIFIED POSTPROCEDURAL STATES: Chronic | ICD-10-CM

## 2018-11-08 DIAGNOSIS — C50.919 MALIGNANT NEOPLASM OF UNSPECIFIED SITE OF UNSPECIFIED FEMALE BREAST: ICD-10-CM

## 2018-11-14 ENCOUNTER — APPOINTMENT (OUTPATIENT)
Dept: HEMATOLOGY ONCOLOGY | Facility: CLINIC | Age: 73
End: 2018-11-14
Payer: MEDICARE

## 2018-11-14 ENCOUNTER — RESULT REVIEW (OUTPATIENT)
Age: 73
End: 2018-11-14

## 2018-11-14 VITALS
TEMPERATURE: 98.1 F | HEART RATE: 69 BPM | WEIGHT: 178.57 LBS | SYSTOLIC BLOOD PRESSURE: 142 MMHG | RESPIRATION RATE: 16 BRPM | BODY MASS INDEX: 33.74 KG/M2 | OXYGEN SATURATION: 94 % | DIASTOLIC BLOOD PRESSURE: 74 MMHG

## 2018-11-14 LAB
HCT VFR BLD CALC: 41.7 % — SIGNIFICANT CHANGE UP (ref 34.5–45)
HGB BLD-MCNC: 14.7 G/DL — SIGNIFICANT CHANGE UP (ref 11.5–15.5)
MCHC RBC-ENTMCNC: 32.8 PG — SIGNIFICANT CHANGE UP (ref 27–34)
MCHC RBC-ENTMCNC: 35.2 G/DL — SIGNIFICANT CHANGE UP (ref 32–36)
MCV RBC AUTO: 93.3 FL — SIGNIFICANT CHANGE UP (ref 80–100)
PLATELET # BLD AUTO: 286 K/UL — SIGNIFICANT CHANGE UP (ref 150–400)
RBC # BLD: 4.47 M/UL — SIGNIFICANT CHANGE UP (ref 3.8–5.2)
RBC # FLD: 11.9 % — SIGNIFICANT CHANGE UP (ref 10.3–14.5)
WBC # BLD: 9.2 K/UL — SIGNIFICANT CHANGE UP (ref 3.8–10.5)
WBC # FLD AUTO: 9.2 K/UL — SIGNIFICANT CHANGE UP (ref 3.8–10.5)

## 2018-11-14 PROCEDURE — 99214 OFFICE O/P EST MOD 30 MIN: CPT

## 2018-11-14 RX ORDER — GABAPENTIN 100 MG/1
100 CAPSULE ORAL
Refills: 0 | Status: ACTIVE | COMMUNITY

## 2018-11-14 RX ORDER — AMLODIPINE BESYLATE 5 MG/1
5 TABLET ORAL
Refills: 0 | Status: ACTIVE | COMMUNITY

## 2018-11-14 RX ORDER — LOVASTATIN 40 MG/1
40 TABLET ORAL
Refills: 0 | Status: ACTIVE | COMMUNITY

## 2018-11-14 NOTE — REASON FOR VISIT
[Follow-Up Visit] : a follow-up [Spouse] : spouse [FreeTextEntry2] : left breast cancer triple negative s/p lumpectomy; radiation; dose dense AC-T. s/p CVA

## 2018-11-14 NOTE — HISTORY OF PRESENT ILLNESS
[Disease: _____________________] : Disease: [unfilled] [T: ___] : T[unfilled] [N: ___] : N[unfilled] [M: ___] : M[unfilled] [AJCC Stage: ____] : AJCC Stage: [unfilled] [Treatment Protocol] : Treatment Protocol [Cardiovascular] : Cardiovascular [Constitutional] : Constitutional [ENT] : ENT [Dermatologic] : Dermatologic [Endocrine] : Endocrine [Gastrointestinal] : Gastrointestinal [Genitourinary] : Genitourinary [Gynecologic] : Gynecologic [Infectious] : Infectious [Musculoskeletal] : Musculoskeletal [Neurologic] : Neurologic [Pain] : Pain [Pulmonary] : Pulmonary [Hematologic] : Hematologic [Date: ____________] : Patient's last distress assessment performed on [unfilled]. [1 - Distress Level] : Distress Level: 1 [de-identified] : At age 70 years, routine mammogram 6/3/2015 noted a dense nodule in the upper outer quadrant of the left breast. Dedicated study 7/20/2015 confirmed an irregular nodule confirmed on ultrasound. Ultrasound biopsy 8/14/2015: invasive poorly differentiated duct cell carcinoma. Breast MRI 8/24/2015: left breast 2.2 cm mass in the left breast and an 8 mm loculated nodule in the upper anterior left breast. Fine needle biopsy 8/31/2015 positive in a left axillary node. Left breast lumpectomy 9/29/2015: invasive poorly differentiated carcinoma triple negative 1.5 cm primary and 1/31+ nodes. She had no post operative problems. ECHO cardiogram EF = 70%. 10/12/2015 CT: no metastatic disease. Had PowerPort inserted for chemotherapy. Treated with dose dense AC-T and hepatitis prophylaxis.  November 2017 mammogram negative. 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 Now off. Had CVA with left sided weakness 2/27/2018 and hospitalized at [facility].MRI small stroke. BP medications changed and then sent to rehabilitation. Doppler carotid study negative. [de-identified] : infiltrating poorly differentiated duct cell carcinoma [de-identified] : ER- [place]-  Her2/miguel angel negative [de-identified] : s/p left lumpectomy\par primary 1.5 cm; 1/31+ nodes\par 3/12/2018 genetics: VUS APC gene; [id number] gene; VUS RECQL4 gene [FreeTextEntry1] : Dose dense AC-T start 11/02/2015; Tenofovir start 11/01/2015; observation [de-identified] : Mammogram pending. Doing well.

## 2018-11-14 NOTE — PHYSICAL EXAM
[Fully active, able to carry on all pre-disease performance without restriction] : Status 0 - Fully active, able to carry on all pre-disease performance without restriction [Obese] : obese [Normal] : affect appropriate [de-identified] : left breast lateral lumpectomy scar; no masses; radiation changes left breast [de-identified] : radiation changes left breast

## 2018-11-14 NOTE — ASSESSMENT
[Curative] : Goals of care discussed with patient: Curative [Palliative Care Plan] : not applicable at this time [FreeTextEntry1] : Left breast cancer T1C N1 triple negative s/p lumpectomy completed dose dense AC-T and radiation to the left breast. Tolerated four cycles of  cycle of AC well.No major problems. Received paclitaxel q 2 weeks X 4 followed by radiation to the involved breast. Has post treatment paresthesia which is minor  but KWAN/stable. Has marked radiation changes to left breast. Genetics evaluation 12/27/2017.BP today is OK and she is on antihypertensives. She has recovered from her CVS., KWAN/stable. \par Plan: \par 1) weight loss\par 2)  cmp, cea, ca 27-29\par 4) genetics evaluation as triple negative pending\par 5)  continue present medications\par 6) reevaluate in 6 months\par

## 2018-11-28 ENCOUNTER — APPOINTMENT (OUTPATIENT)
Dept: MAMMOGRAPHY | Facility: IMAGING CENTER | Age: 73
End: 2018-11-28
Payer: MEDICARE

## 2018-11-28 ENCOUNTER — APPOINTMENT (OUTPATIENT)
Dept: SURGICAL ONCOLOGY | Facility: CLINIC | Age: 73
End: 2018-11-28
Payer: MEDICARE

## 2018-11-28 ENCOUNTER — APPOINTMENT (OUTPATIENT)
Dept: ULTRASOUND IMAGING | Facility: IMAGING CENTER | Age: 73
End: 2018-11-28
Payer: MEDICARE

## 2018-11-28 ENCOUNTER — OUTPATIENT (OUTPATIENT)
Dept: OUTPATIENT SERVICES | Facility: HOSPITAL | Age: 73
LOS: 1 days | End: 2018-11-28
Payer: COMMERCIAL

## 2018-11-28 VITALS
OXYGEN SATURATION: 93 % | WEIGHT: 180 LBS | HEIGHT: 61 IN | DIASTOLIC BLOOD PRESSURE: 86 MMHG | HEART RATE: 78 BPM | BODY MASS INDEX: 33.99 KG/M2 | SYSTOLIC BLOOD PRESSURE: 143 MMHG

## 2018-11-28 DIAGNOSIS — C50.919 MALIGNANT NEOPLASM OF UNSPECIFIED SITE OF UNSPECIFIED FEMALE BREAST: ICD-10-CM

## 2018-11-28 DIAGNOSIS — Z85.3 ENCOUNTER FOR FOLLOW-UP EXAMINATION AFTER COMPLETED TREATMENT FOR MALIGNANT NEOPLASM: ICD-10-CM

## 2018-11-28 DIAGNOSIS — Z98.89 OTHER SPECIFIED POSTPROCEDURAL STATES: Chronic | ICD-10-CM

## 2018-11-28 DIAGNOSIS — Z08 ENCOUNTER FOR FOLLOW-UP EXAMINATION AFTER COMPLETED TREATMENT FOR MALIGNANT NEOPLASM: ICD-10-CM

## 2018-11-28 PROCEDURE — 76641 ULTRASOUND BREAST COMPLETE: CPT

## 2018-11-28 PROCEDURE — 76641 ULTRASOUND BREAST COMPLETE: CPT | Mod: 26,50

## 2018-11-28 PROCEDURE — 77066 DX MAMMO INCL CAD BI: CPT | Mod: 26

## 2018-11-28 PROCEDURE — G0279: CPT

## 2018-11-28 PROCEDURE — 99214 OFFICE O/P EST MOD 30 MIN: CPT

## 2018-11-28 PROCEDURE — G0279: CPT | Mod: 26

## 2018-11-28 PROCEDURE — 77066 DX MAMMO INCL CAD BI: CPT

## 2018-12-10 LAB
ALBUMIN SERPL ELPH-MCNC: 4.1 G/DL
ALBUMIN SERPL ELPH-MCNC: 4.3 G/DL
ALBUMIN SERPL ELPH-MCNC: 4.3 G/DL
ALP BLD-CCNC: 92 U/L
ALP BLD-CCNC: 95 U/L
ALP BLD-CCNC: 96 U/L
ALT SERPL-CCNC: 20 U/L
ALT SERPL-CCNC: 28 U/L
ALT SERPL-CCNC: 40 U/L
ANION GAP SERPL CALC-SCNC: 12 MMOL/L
ANION GAP SERPL CALC-SCNC: 16 MMOL/L
ANION GAP SERPL CALC-SCNC: 18 MMOL/L
AST SERPL-CCNC: 15 U/L
AST SERPL-CCNC: 19 U/L
AST SERPL-CCNC: 30 U/L
BILIRUB SERPL-MCNC: 0.5 MG/DL
BILIRUB SERPL-MCNC: 0.5 MG/DL
BILIRUB SERPL-MCNC: 0.6 MG/DL
BUN SERPL-MCNC: 10 MG/DL
BUN SERPL-MCNC: 12 MG/DL
BUN SERPL-MCNC: 9 MG/DL
CALCIUM SERPL-MCNC: 9.6 MG/DL
CALCIUM SERPL-MCNC: 9.8 MG/DL
CALCIUM SERPL-MCNC: 9.9 MG/DL
CANCER AG27-29 SERPL-ACNC: 19.5 U/ML
CANCER AG27-29 SERPL-ACNC: 23.8 U/ML
CANCER AG27-29 SERPL-ACNC: 24 U/ML
CANCER AG27-29 SERPL-ACNC: 25.8 U/ML
CEA SERPL-MCNC: 0.9 NG/ML
CEA SERPL-MCNC: 0.9 NG/ML
CEA SERPL-MCNC: 1.1 NG/ML
CHLORIDE SERPL-SCNC: 101 MMOL/L
CO2 SERPL-SCNC: 24 MMOL/L
CO2 SERPL-SCNC: 25 MMOL/L
CO2 SERPL-SCNC: 27 MMOL/L
CREAT SERPL-MCNC: 0.45 MG/DL
CREAT SERPL-MCNC: 0.5 MG/DL
CREAT SERPL-MCNC: 0.56 MG/DL
GLUCOSE SERPL-MCNC: 120 MG/DL
GLUCOSE SERPL-MCNC: 124 MG/DL
GLUCOSE SERPL-MCNC: 75 MG/DL
POTASSIUM SERPL-SCNC: 4.1 MMOL/L
POTASSIUM SERPL-SCNC: 4.6 MMOL/L
POTASSIUM SERPL-SCNC: 4.7 MMOL/L
PROT SERPL-MCNC: 7.6 G/DL
PROT SERPL-MCNC: 7.6 G/DL
PROT SERPL-MCNC: 7.8 G/DL
SODIUM SERPL-SCNC: 140 MMOL/L
SODIUM SERPL-SCNC: 142 MMOL/L
SODIUM SERPL-SCNC: 143 MMOL/L

## 2019-01-01 NOTE — DISCHARGE NOTE ADULT - CARE PROVIDER_API CALL
Infant vital signs stable and meeting expected outcomes.  Q4 vitals with O2 check continue.  Irregular heartbeat heard during 0400 vitals check x4 during a 1 min period.  Repeat EKG this morning, along with repeat metabolic panel and TSB.  Infant remains on bili bed per MD order.  Voiding and stooling adequately for age.  Parents able to perform all cares for infant.  Bonding well with parents.  Plan to discharge home today.  Will continue to monitor.   Follow up,   with Neurology and Cardiology within 1 week  Phone: (   )    -  Fax: (   )    -

## 2019-02-13 NOTE — ASU PREOP CHECKLIST - DENTURES
Telephone Encounter by Vanessa Payton at 10/01/18 04:07 PM     Author:  Vanessa Payton Service:  (none) Author Type:  Patient      Filed:  10/01/18 04:09 PM Encounter Date:  9/29/2018 Status:  Signed     :  Vanessa Payton (Patient )            patient is out of medication and is following up on his refill request has not heard anything and would like a call back with an update. [JA1.1M]       Revision History        User Key Date/Time User Provider Type Action    > JA1.1 10/01/18 04:09 PM Vanessa Payton Patient  Sign    M - Manual no

## 2019-04-12 ENCOUNTER — OUTPATIENT (OUTPATIENT)
Dept: OUTPATIENT SERVICES | Facility: HOSPITAL | Age: 74
LOS: 1 days | Discharge: HOME | End: 2019-04-12

## 2019-04-12 DIAGNOSIS — C50.919 MALIGNANT NEOPLASM OF UNSPECIFIED SITE OF UNSPECIFIED FEMALE BREAST: ICD-10-CM

## 2019-04-12 DIAGNOSIS — Z98.89 OTHER SPECIFIED POSTPROCEDURAL STATES: Chronic | ICD-10-CM

## 2019-04-17 ENCOUNTER — APPOINTMENT (OUTPATIENT)
Dept: HEMATOLOGY ONCOLOGY | Facility: CLINIC | Age: 74
End: 2019-04-17
Payer: MEDICARE

## 2019-04-17 ENCOUNTER — RESULT REVIEW (OUTPATIENT)
Age: 74
End: 2019-04-17

## 2019-04-17 VITALS
WEIGHT: 180.78 LBS | RESPIRATION RATE: 14 BRPM | HEART RATE: 78 BPM | BODY MASS INDEX: 34.16 KG/M2 | DIASTOLIC BLOOD PRESSURE: 70 MMHG | OXYGEN SATURATION: 96 % | SYSTOLIC BLOOD PRESSURE: 130 MMHG | TEMPERATURE: 98.4 F

## 2019-04-17 LAB
HCT VFR BLD CALC: 41.2 % — SIGNIFICANT CHANGE UP (ref 34.5–45)
HGB BLD-MCNC: 14.3 G/DL — SIGNIFICANT CHANGE UP (ref 11.5–15.5)
MCHC RBC-ENTMCNC: 32.5 PG — SIGNIFICANT CHANGE UP (ref 27–34)
MCHC RBC-ENTMCNC: 34.8 G/DL — SIGNIFICANT CHANGE UP (ref 32–36)
MCV RBC AUTO: 93.6 FL — SIGNIFICANT CHANGE UP (ref 80–100)
PLATELET # BLD AUTO: 314 K/UL — SIGNIFICANT CHANGE UP (ref 150–400)
RBC # BLD: 4.4 M/UL — SIGNIFICANT CHANGE UP (ref 3.8–5.2)
RBC # FLD: 11.8 % — SIGNIFICANT CHANGE UP (ref 10.3–14.5)
WBC # BLD: 8.4 K/UL — SIGNIFICANT CHANGE UP (ref 3.8–10.5)
WBC # FLD AUTO: 8.4 K/UL — SIGNIFICANT CHANGE UP (ref 3.8–10.5)

## 2019-04-17 PROCEDURE — 99213 OFFICE O/P EST LOW 20 MIN: CPT

## 2019-04-17 NOTE — REASON FOR VISIT
[Follow-Up Visit] : a follow-up [Spouse] : spouse [FreeTextEntry2] :  left breast cancer triple negative s/p lumpectomy; radiation; dose dense AC-T. s/p CVA.

## 2019-04-17 NOTE — HISTORY OF PRESENT ILLNESS
[Disease: _____________________] : Disease: [unfilled] [T: ___] : T[unfilled] [N: ___] : N[unfilled] [M: ___] : M[unfilled] [AJCC Stage: ____] : AJCC Stage: [unfilled] [Treatment Protocol] : Treatment Protocol [Cardiovascular] : Cardiovascular [Constitutional] : Constitutional [Dermatologic] : Dermatologic [ENT] : ENT [Endocrine] : Endocrine [Gastrointestinal] : Gastrointestinal [Genitourinary] : Genitourinary [Infectious] : Infectious [Gynecologic] : Gynecologic [Musculoskeletal] : Musculoskeletal [Neurologic] : Neurologic [Pain] : Pain [Pulmonary] : Pulmonary [Hematologic] : Hematologic [Date: ____________] : Patient's last distress assessment performed on [unfilled]. [2 - Distress Level] : Distress Level: 2 [de-identified] : At age 70 years, routine mammogram 6/3/2015 noted a dense nodule in the upper outer quadrant of the left breast. Dedicated study 7/20/2015 confirmed an irregular nodule confirmed on ultrasound. Ultrasound biopsy 8/14/2015: invasive poorly differentiated duct cell carcinoma. Breast MRI 8/24/2015: left breast 2.2 cm mass in the left breast and an 8 mm loculated nodule in the upper anterior left breast. Fine needle biopsy 8/31/2015 positive in a left axillary node. Left breast lumpectomy 9/29/2015: invasive poorly differentiated carcinoma triple negative 1.5 cm primary and 1/31+ nodes. She had no post operative problems. ECHO cardiogram EF = 70%. 10/12/2015 CT: no metastatic disease. Had PowerPort inserted for chemotherapy. Treated with dose dense AC-T and hepatitis prophylaxis.  November 2017 mammogram negative. Getting mammograms by surgeon. 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 Now off. Had CVA with left sided weakness 2/27/2018 and hospitalized at [facility].MRI small stroke. BP medications changed and then sent to rehabilitation. Doppler carotid study negative. [de-identified] : ER- [place]-  Her2/miguel angel negative [de-identified] : s/p left lumpectomy\par primary 1.5 cm; 1/31+ nodes\par 3/12/2018 genetics: VUS APC gene; [id number] gene; VUS RECQL4 gene [de-identified] : infiltrating poorly differentiated duct cell carcinoma [FreeTextEntry1] : Dose dense AC-T start 11/02/2015; Tenofovir start 11/01/2015; observation [de-identified] : last mammogram 2018 negative. Doing well.

## 2019-04-17 NOTE — ASSESSMENT
[Curative] : Goals of care discussed with patient: Curative [Palliative Care Plan] : not applicable at this time [FreeTextEntry1] : Left breast cancer T1C N1 triple negative s/p lumpectomy completed dose dense AC-T and radiation to the left breast. Tolerated four cycles of  cycle of AC well.No major problems. Received paclitaxel q 2 weeks X 4 followed by radiation to the involved breast. Has post treatment paresthesia which is minor  but KWAN/stable. Has marked radiation changes to left breast. Genetics evaluation 12/27/2017.BP today is OK and she is on antihypertensives. She has recovered from her CVA., Genetic evaluation negative. KWAN/stable. \par Plan: \par 1) weight loss\par 2)  cmp, cea, ca 27-29\par 3)  continue present medications\par 6)  reevaluate in 6 months\par

## 2019-04-17 NOTE — PHYSICAL EXAM
[Fully active, able to carry on all pre-disease performance without restriction] : Status 0 - Fully active, able to carry on all pre-disease performance without restriction [Obese] : obese [Normal] : affect appropriate [de-identified] : radiation changes left breast [de-identified] : left breast lateral lumpectomy scar; no masses; radiation changes left breast

## 2019-04-18 LAB
25(OH)D3 SERPL-MCNC: 28.8 NG/ML
ALBUMIN SERPL ELPH-MCNC: 4.4 G/DL
ALP BLD-CCNC: 95 U/L
ALT SERPL-CCNC: 35 U/L
ANION GAP SERPL CALC-SCNC: 18 MMOL/L
AST SERPL-CCNC: 25 U/L
BILIRUB SERPL-MCNC: 0.5 MG/DL
BUN SERPL-MCNC: 5 MG/DL
CALCIUM SERPL-MCNC: 9.6 MG/DL
CANCER AG27-29 SERPL-ACNC: 30.6 U/ML
CEA SERPL-MCNC: 0.8 NG/ML
CHLORIDE SERPL-SCNC: 106 MMOL/L
CO2 SERPL-SCNC: 22 MMOL/L
CREAT SERPL-MCNC: 0.41 MG/DL
GLUCOSE SERPL-MCNC: 83 MG/DL
POTASSIUM SERPL-SCNC: 4.5 MMOL/L
PROT SERPL-MCNC: 7.5 G/DL
SODIUM SERPL-SCNC: 146 MMOL/L

## 2019-06-24 ENCOUNTER — APPOINTMENT (OUTPATIENT)
Dept: SURGICAL ONCOLOGY | Facility: CLINIC | Age: 74
End: 2019-06-24
Payer: MEDICARE

## 2019-06-24 VITALS
OXYGEN SATURATION: 95 % | HEART RATE: 82 BPM | BODY MASS INDEX: 34.55 KG/M2 | DIASTOLIC BLOOD PRESSURE: 89 MMHG | RESPIRATION RATE: 18 BRPM | WEIGHT: 183 LBS | SYSTOLIC BLOOD PRESSURE: 134 MMHG | TEMPERATURE: 98.2 F | HEIGHT: 61 IN

## 2019-06-24 DIAGNOSIS — C50.919 MALIGNANT NEOPLASM OF UNSPECIFIED SITE OF UNSPECIFIED FEMALE BREAST: ICD-10-CM

## 2019-06-24 PROCEDURE — 99214 OFFICE O/P EST MOD 30 MIN: CPT

## 2019-06-24 NOTE — HISTORY OF PRESENT ILLNESS
[de-identified] : Lori is a 73 year old female who presents for her 6 month breast exam.  \par \par She is s/p LEFT Breast Lumpectomy and axillary dissection on 9/29/15 for LEFT breast infiltrating, poorly differentiated, triple negative CA (T1cN1a with metastatic disease to 1/32 nodes).  She received adjuvant chemotherapy under the supervision of Dr. Thrasher.  She also received adjuvant XRT under the supervision of Dr. Sweet.   [place] was removed in May 2017. \par \par Most recent imaging consists of a mammo/sono performed on 11/28/18 revealed no evidence of malignancy. BIRADS 2 \par \par BW as per Dr. Thrasher 4/2019:\par CEA (0.8)\par CA27.29 (30.6)\par \par On plavix for TIA in Feb 2018.\par \par  Denies palpable breast masses, nipple discharge, skin changes, inversion or breast pain. Denies constitutional symptoms. C/o occasional left upper inner arm discomfort and tingling to her left fingers. Denies swelling to the left arm.

## 2019-06-24 NOTE — PHYSICAL EXAM
[Normal] : supple, no neck mass and thyroid not enlarged [Normal Supraclavicular Lymph Nodes] : normal supraclavicular lymph nodes [Normal Axillary Lymph Nodes] : normal axillary lymph nodes [Normal] : oriented to person, place and time, with appropriate affect [de-identified] : post radiation skin changes to left breast but no masses or nipple discharge bilaterally.

## 2019-06-24 NOTE — ASSESSMENT
[FreeTextEntry1] : IMP:\par KWAN -s/p LEFT Breast Lumpectomy and axillary dissection on 9/29/15 for LEFT breast infiltrating, poorly differentiated, triple negative CA (T1cN1a with metastatic disease to 1/32 nodes)\par S/p Chemo and XRT\par \par \par Plan:\par RTO q 6 months\par Yearly mammo/sono Nov 2019\par Bloodwork by Dr. Thrasher\par

## 2019-10-21 ENCOUNTER — OUTPATIENT (OUTPATIENT)
Dept: OUTPATIENT SERVICES | Facility: HOSPITAL | Age: 74
LOS: 1 days | Discharge: HOME | End: 2019-10-21

## 2019-10-21 DIAGNOSIS — Z98.89 OTHER SPECIFIED POSTPROCEDURAL STATES: Chronic | ICD-10-CM

## 2019-10-21 DIAGNOSIS — C50.919 MALIGNANT NEOPLASM OF UNSPECIFIED SITE OF UNSPECIFIED FEMALE BREAST: ICD-10-CM

## 2019-10-31 ENCOUNTER — APPOINTMENT (OUTPATIENT)
Dept: HEMATOLOGY ONCOLOGY | Facility: CLINIC | Age: 74
End: 2019-10-31
Payer: MEDICARE

## 2019-10-31 VITALS
BODY MASS INDEX: 33.38 KG/M2 | HEART RATE: 85 BPM | RESPIRATION RATE: 18 BRPM | DIASTOLIC BLOOD PRESSURE: 83 MMHG | OXYGEN SATURATION: 95 % | TEMPERATURE: 98.5 F | SYSTOLIC BLOOD PRESSURE: 125 MMHG | HEIGHT: 61 IN | WEIGHT: 176.81 LBS

## 2019-10-31 PROCEDURE — 99215 OFFICE O/P EST HI 40 MIN: CPT

## 2019-11-08 NOTE — ASSESSMENT
[Curative] : Goals of care discussed with patient: Curative [Palliative Care Plan] : not applicable at this time [FreeTextEntry1] : 75yo woman with Left breast cancer T1C N1 triple negative s/p lumpectomy mid 2015, completed dose dense AC-T and radiation to the left breast.  Has post treatment paresthesia which is minor but KWAN/stable, no new complaints.\par \par -discussed weight loss, basic labs today\par -follow up 6 months, sooner if issues\par -RHM all discussed: PMD, DEXA, Cscope, gynecologist - follow up endometrial polyp ASAP, dermatology, ophthalmology follow up\par -She saw Dr. Adkins 6/2019, 6 month follow up planned with mammogram/sonogram/surveillance imaging per him

## 2019-11-08 NOTE — HISTORY OF PRESENT ILLNESS
[Disease: _____________________] : Disease: [unfilled] [T: ___] : T[unfilled] [N: ___] : N[unfilled] [M: ___] : M[unfilled] [AJCC Stage: ____] : AJCC Stage: [unfilled] [Treatment Protocol] : Treatment Protocol [Cardiovascular] : Cardiovascular [Constitutional] : Constitutional [ENT] : ENT [Dermatologic] : Dermatologic [Endocrine] : Endocrine [Gastrointestinal] : Gastrointestinal [Genitourinary] : Genitourinary [Gynecologic] : Gynecologic [Infectious] : Infectious [Musculoskeletal] : Musculoskeletal [Neurologic] : Neurologic [Pain] : Pain [Pulmonary] : Pulmonary [Hematologic] : Hematologic [de-identified] : infiltrating poorly differentiated duct cell carcinoma [de-identified] : At age 70 years, routine mammogram 6/3/2015 noted a dense nodule in the upper outer quadrant of the left breast. Dedicated study 7/20/2015 confirmed an irregular nodule confirmed on ultrasound. Ultrasound biopsy 8/14/2015: invasive poorly differentiated duct cell carcinoma. Breast MRI 8/24/2015: left breast 2.2 cm mass in the left breast and an 8 mm loculated nodule in the upper anterior left breast. Fine needle biopsy 8/31/2015 positive in a left axillary node. Left breast lumpectomy 9/29/2015: invasive poorly differentiated carcinoma triple negative 1.5 cm primary and 1/31+ nodes. She had no post operative problems. ECHO cardiogram EF = 70%. 10/12/2015 CT: no metastatic disease. Had PowerPort inserted for chemotherapy. Treated with dose dense AC-T and hepatitis prophylaxis.  November 2017 mammogram negative. Getting mammograms by surgeon Dr. Adkins. 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 Now off. Had CVA with left sided weakness 2/27/2018 and hospitalized at [facility].MRI small stroke/TIA - onplavis. BP medications changed and then sent to rehabilitation. Doppler carotid study negative.\par \par She also received adjuvant XRT under the supervision of Dr. Sweet. Mediport was removed in May 2017. \par \par Most recent imaging consists of a mammo/sono performed on 11/28/18 revealed no evidence of malignancy. BIRADS 2 \par Genetics evaluation 12/27/2017 negative [FreeTextEntry1] : Dose dense AC-T start 11/02/2015; Tenofovir start 11/01/2015; observation [de-identified] : s/p left lumpectomy\par primary 1.5 cm; 1/31+ nodes\par 3/12/2018 genetics: VUS APC gene; [id number] gene; VUS RECQL4 gene [de-identified] : ER- [place]-  Her2/miguel angel negative [de-identified] : Patient presents to establish ongoing breast medical oncology follow up with me. She last saw Dr. Thrasher 4/2019. She saw Dr. Adkins 6/2019, 6 month follow up planned. She is doing well, no current complaints or concerns.

## 2019-11-08 NOTE — PHYSICAL EXAM
[Fully active, able to carry on all pre-disease performance without restriction] : Status 0 - Fully active, able to carry on all pre-disease performance without restriction [Obese] : obese [Normal] : affect appropriate [de-identified] : left breast lateral lumpectomy scar; no masses or adenopathy b/l palpable; radiation changes left breast

## 2019-12-06 ENCOUNTER — FORM ENCOUNTER (OUTPATIENT)
Age: 74
End: 2019-12-06

## 2019-12-07 ENCOUNTER — APPOINTMENT (OUTPATIENT)
Dept: MAMMOGRAPHY | Facility: IMAGING CENTER | Age: 74
End: 2019-12-07
Payer: MEDICARE

## 2019-12-07 ENCOUNTER — APPOINTMENT (OUTPATIENT)
Dept: ULTRASOUND IMAGING | Facility: IMAGING CENTER | Age: 74
End: 2019-12-07
Payer: MEDICARE

## 2019-12-07 ENCOUNTER — OUTPATIENT (OUTPATIENT)
Dept: OUTPATIENT SERVICES | Facility: HOSPITAL | Age: 74
LOS: 1 days | End: 2019-12-07
Payer: COMMERCIAL

## 2019-12-07 DIAGNOSIS — Z98.89 OTHER SPECIFIED POSTPROCEDURAL STATES: Chronic | ICD-10-CM

## 2019-12-07 DIAGNOSIS — C50.919 MALIGNANT NEOPLASM OF UNSPECIFIED SITE OF UNSPECIFIED FEMALE BREAST: ICD-10-CM

## 2019-12-07 PROCEDURE — 76641 ULTRASOUND BREAST COMPLETE: CPT | Mod: 26,50

## 2019-12-07 PROCEDURE — 76641 ULTRASOUND BREAST COMPLETE: CPT

## 2019-12-07 PROCEDURE — 77066 DX MAMMO INCL CAD BI: CPT | Mod: 26

## 2019-12-07 PROCEDURE — G0279: CPT | Mod: 26

## 2019-12-07 PROCEDURE — 77066 DX MAMMO INCL CAD BI: CPT

## 2019-12-07 PROCEDURE — G0279: CPT

## 2020-04-21 ENCOUNTER — OUTPATIENT (OUTPATIENT)
Dept: OUTPATIENT SERVICES | Facility: HOSPITAL | Age: 75
LOS: 1 days | Discharge: HOME | End: 2020-04-21

## 2020-04-21 DIAGNOSIS — C50.919 MALIGNANT NEOPLASM OF UNSPECIFIED SITE OF UNSPECIFIED FEMALE BREAST: ICD-10-CM

## 2020-04-21 DIAGNOSIS — Z98.89 OTHER SPECIFIED POSTPROCEDURAL STATES: Chronic | ICD-10-CM

## 2020-04-28 ENCOUNTER — APPOINTMENT (OUTPATIENT)
Dept: HEMATOLOGY ONCOLOGY | Facility: CLINIC | Age: 75
End: 2020-04-28

## 2020-11-25 ENCOUNTER — NON-APPOINTMENT (OUTPATIENT)
Age: 75
End: 2020-11-25

## 2020-12-21 ENCOUNTER — OUTPATIENT (OUTPATIENT)
Dept: OUTPATIENT SERVICES | Facility: HOSPITAL | Age: 75
LOS: 1 days | End: 2020-12-21
Payer: COMMERCIAL

## 2020-12-21 ENCOUNTER — APPOINTMENT (OUTPATIENT)
Dept: ULTRASOUND IMAGING | Facility: IMAGING CENTER | Age: 75
End: 2020-12-21
Payer: MEDICARE

## 2020-12-21 ENCOUNTER — APPOINTMENT (OUTPATIENT)
Dept: MAMMOGRAPHY | Facility: IMAGING CENTER | Age: 75
End: 2020-12-21
Payer: MEDICARE

## 2020-12-21 DIAGNOSIS — Z00.8 ENCOUNTER FOR OTHER GENERAL EXAMINATION: ICD-10-CM

## 2020-12-21 DIAGNOSIS — Z98.89 OTHER SPECIFIED POSTPROCEDURAL STATES: Chronic | ICD-10-CM

## 2020-12-21 PROCEDURE — 77066 DX MAMMO INCL CAD BI: CPT

## 2020-12-21 PROCEDURE — G0279: CPT | Mod: 26

## 2020-12-21 PROCEDURE — 76641 ULTRASOUND BREAST COMPLETE: CPT

## 2020-12-21 PROCEDURE — 76641 ULTRASOUND BREAST COMPLETE: CPT | Mod: 26,50

## 2020-12-21 PROCEDURE — G0279: CPT

## 2020-12-21 PROCEDURE — 77066 DX MAMMO INCL CAD BI: CPT | Mod: 26

## 2022-01-14 ENCOUNTER — OUTPATIENT (OUTPATIENT)
Dept: OUTPATIENT SERVICES | Facility: HOSPITAL | Age: 77
LOS: 1 days | End: 2022-01-14
Payer: COMMERCIAL

## 2022-01-14 ENCOUNTER — APPOINTMENT (OUTPATIENT)
Dept: MAMMOGRAPHY | Facility: IMAGING CENTER | Age: 77
End: 2022-01-14
Payer: MEDICARE

## 2022-01-14 ENCOUNTER — APPOINTMENT (OUTPATIENT)
Dept: ULTRASOUND IMAGING | Facility: IMAGING CENTER | Age: 77
End: 2022-01-14
Payer: MEDICARE

## 2022-01-14 DIAGNOSIS — Z98.89 OTHER SPECIFIED POSTPROCEDURAL STATES: Chronic | ICD-10-CM

## 2022-01-14 DIAGNOSIS — Z00.8 ENCOUNTER FOR OTHER GENERAL EXAMINATION: ICD-10-CM

## 2022-01-14 PROCEDURE — 77067 SCR MAMMO BI INCL CAD: CPT | Mod: 26

## 2022-01-14 PROCEDURE — 77063 BREAST TOMOSYNTHESIS BI: CPT | Mod: 26

## 2022-01-14 PROCEDURE — 77067 SCR MAMMO BI INCL CAD: CPT

## 2022-01-14 PROCEDURE — 77063 BREAST TOMOSYNTHESIS BI: CPT

## 2022-11-11 ENCOUNTER — NON-APPOINTMENT (OUTPATIENT)
Age: 77
End: 2022-11-11

## 2022-11-11 NOTE — DISCUSSION/SUMMARY
[FreeTextEntry1] : 11/11/2022\par \par Informed patient the APC variant of uncertain significance (VUS) (c.[id number]; [id number]) previously identified on her [facility] Multi-cancer panel genetic testing has been reclassified as likely benign. A new report was issued which will be released to the patient via the laboratory portal and uploaded to her chart. Patient should continue screening recommendations discussed previously given her personal/family history.\par \par Armando Figueroa MS, [facility] [place]\par Genetic Counselor

## 2023-01-15 NOTE — PHYSICAL THERAPY INITIAL EVALUATION ADULT - BALANCE DISTURBANCE, IDENTIFIED IMPAIRMENT CONTRIBUTE, REHAB EVAL
Received patient alert and oriented to name. Explained plan of care. Reinforced high risk fall precautions, bed in lowest position, side rails up x 2, bed alarm on, call light within reach and hourly rounding instituted.   decreased strength

## 2023-04-10 ENCOUNTER — APPOINTMENT (OUTPATIENT)
Dept: ULTRASOUND IMAGING | Facility: IMAGING CENTER | Age: 78
End: 2023-04-10
Payer: MEDICARE

## 2023-04-10 ENCOUNTER — OUTPATIENT (OUTPATIENT)
Dept: OUTPATIENT SERVICES | Facility: HOSPITAL | Age: 78
LOS: 1 days | End: 2023-04-10
Payer: COMMERCIAL

## 2023-04-10 ENCOUNTER — APPOINTMENT (OUTPATIENT)
Dept: MAMMOGRAPHY | Facility: IMAGING CENTER | Age: 78
End: 2023-04-10
Payer: MEDICARE

## 2023-04-10 DIAGNOSIS — Z00.8 ENCOUNTER FOR OTHER GENERAL EXAMINATION: ICD-10-CM

## 2023-04-10 DIAGNOSIS — Z98.89 OTHER SPECIFIED POSTPROCEDURAL STATES: Chronic | ICD-10-CM

## 2023-04-10 PROCEDURE — 76641 ULTRASOUND BREAST COMPLETE: CPT

## 2023-04-10 PROCEDURE — 77067 SCR MAMMO BI INCL CAD: CPT | Mod: 26

## 2023-04-10 PROCEDURE — 76641 ULTRASOUND BREAST COMPLETE: CPT | Mod: 26,50

## 2023-04-10 PROCEDURE — 77063 BREAST TOMOSYNTHESIS BI: CPT | Mod: 26

## 2023-04-10 PROCEDURE — 77063 BREAST TOMOSYNTHESIS BI: CPT

## 2023-04-10 PROCEDURE — 77067 SCR MAMMO BI INCL CAD: CPT

## 2023-05-12 NOTE — H&P ADULT - VASCULAR
Do not drive or operate machinery/No heavy lifting/straining/Follow Instructions Provided by your Surgical Team
detailed exam

## 2024-04-25 ENCOUNTER — APPOINTMENT (OUTPATIENT)
Dept: ULTRASOUND IMAGING | Facility: IMAGING CENTER | Age: 79
End: 2024-04-25
Payer: MEDICARE

## 2024-04-25 ENCOUNTER — OUTPATIENT (OUTPATIENT)
Dept: OUTPATIENT SERVICES | Facility: HOSPITAL | Age: 79
LOS: 1 days | End: 2024-04-25
Payer: COMMERCIAL

## 2024-04-25 ENCOUNTER — APPOINTMENT (OUTPATIENT)
Dept: MAMMOGRAPHY | Facility: IMAGING CENTER | Age: 79
End: 2024-04-25
Payer: MEDICARE

## 2024-04-25 DIAGNOSIS — Z98.89 OTHER SPECIFIED POSTPROCEDURAL STATES: Chronic | ICD-10-CM

## 2024-04-25 DIAGNOSIS — Z00.8 ENCOUNTER FOR OTHER GENERAL EXAMINATION: ICD-10-CM

## 2024-04-25 PROCEDURE — 76641 ULTRASOUND BREAST COMPLETE: CPT | Mod: 26,50

## 2024-04-25 PROCEDURE — 76641 ULTRASOUND BREAST COMPLETE: CPT

## 2024-04-25 PROCEDURE — 77067 SCR MAMMO BI INCL CAD: CPT

## 2024-04-25 PROCEDURE — 77067 SCR MAMMO BI INCL CAD: CPT | Mod: 26

## 2024-04-25 PROCEDURE — 77063 BREAST TOMOSYNTHESIS BI: CPT | Mod: 26

## 2024-04-25 PROCEDURE — 77063 BREAST TOMOSYNTHESIS BI: CPT

## 2024-12-23 NOTE — H&P PST ADULT - LAST STRESS TEST
*   ADEQUATE   FLUID  INTAKE   AND  ELECTROLYTE  BALANCE           * KEEP  DAIRY  OF   THE  NEUROLOGICAL  SYMPTOMS          *  TO  MAINTAIN  REGULAR  SLEEP  WAKE  CYCLES. *   TO  HAVE  ADEQUATE  REST  AND   SLEEP    HOURS.        *    AVOID  USAGE OF   TOBACCO,  EXCESSIVE  ALCOHOL                AND   ILLEGAL   SUBSTANCES,  IF  ANY          *  Maintain   Healthy  Life Style    With   Periodic  Monitoring  Of      Any  Medical  Conditions  Including   Elevated  Blood  Pressure,  Lipid  Profile,     Blood  Sugar levels  And   Heart  Disease. *   Period   Screening  For  Cancers  Involving  Breast,  Colon,   lungs  And  Other  Organs  As  Applicable,  In consultation   With  Your  Primary Care Providers. *  If   There is  Any  Significant  Worsening   Of  Current  Symptoms  And  Or  If    Any additional  New  Neurological  Symptoms                 Or  Significant  Concerns   Should  Call  911 or  Go  To  Emergency  Department  For  Further  Immediate  Evaluation.
2015
BACK PAIN

## 2025-05-19 ENCOUNTER — APPOINTMENT (OUTPATIENT)
Dept: MAMMOGRAPHY | Facility: IMAGING CENTER | Age: 80
End: 2025-05-19
Payer: MEDICARE

## 2025-05-19 ENCOUNTER — APPOINTMENT (OUTPATIENT)
Dept: ULTRASOUND IMAGING | Facility: IMAGING CENTER | Age: 80
End: 2025-05-19
Payer: MEDICARE

## 2025-05-19 ENCOUNTER — OUTPATIENT (OUTPATIENT)
Dept: OUTPATIENT SERVICES | Facility: HOSPITAL | Age: 80
LOS: 1 days | End: 2025-05-19
Payer: COMMERCIAL

## 2025-05-19 ENCOUNTER — TRANSCRIPTION ENCOUNTER (OUTPATIENT)
Age: 80
End: 2025-05-19

## 2025-05-19 DIAGNOSIS — Z00.8 ENCOUNTER FOR OTHER GENERAL EXAMINATION: ICD-10-CM

## 2025-05-19 DIAGNOSIS — Z98.89 OTHER SPECIFIED POSTPROCEDURAL STATES: Chronic | ICD-10-CM

## 2025-05-19 PROCEDURE — 77067 SCR MAMMO BI INCL CAD: CPT | Mod: 26

## 2025-05-19 PROCEDURE — 76641 ULTRASOUND BREAST COMPLETE: CPT

## 2025-05-19 PROCEDURE — 76641 ULTRASOUND BREAST COMPLETE: CPT | Mod: 26,50

## 2025-05-19 PROCEDURE — 77063 BREAST TOMOSYNTHESIS BI: CPT

## 2025-05-19 PROCEDURE — 77063 BREAST TOMOSYNTHESIS BI: CPT | Mod: 26

## 2025-05-19 PROCEDURE — 77067 SCR MAMMO BI INCL CAD: CPT

## 2025-07-14 ENCOUNTER — NON-APPOINTMENT (OUTPATIENT)
Age: 80
End: 2025-07-14